# Patient Record
Sex: FEMALE | Race: WHITE | NOT HISPANIC OR LATINO | Employment: OTHER | URBAN - METROPOLITAN AREA
[De-identification: names, ages, dates, MRNs, and addresses within clinical notes are randomized per-mention and may not be internally consistent; named-entity substitution may affect disease eponyms.]

---

## 2017-02-01 ENCOUNTER — HOSPITAL ENCOUNTER (EMERGENCY)
Facility: CLINIC | Age: 57
Discharge: HOME | End: 2017-02-01
Attending: EMERGENCY MEDICINE | Admitting: EMERGENCY MEDICINE
Payer: COMMERCIAL

## 2017-02-01 ENCOUNTER — OFFICE VISIT (OUTPATIENT)
Dept: URGENT CARE | Facility: URGENT CARE | Age: 57
End: 2017-02-01
Payer: COMMERCIAL

## 2017-02-01 VITALS
BODY MASS INDEX: 29.35 KG/M2 | SYSTOLIC BLOOD PRESSURE: 122 MMHG | DIASTOLIC BLOOD PRESSURE: 78 MMHG | HEART RATE: 79 BPM | OXYGEN SATURATION: 96 % | TEMPERATURE: 97.6 F | WEIGHT: 184.6 LBS

## 2017-02-01 VITALS
BODY MASS INDEX: 28.88 KG/M2 | OXYGEN SATURATION: 96 % | WEIGHT: 184 LBS | SYSTOLIC BLOOD PRESSURE: 136 MMHG | DIASTOLIC BLOOD PRESSURE: 86 MMHG | TEMPERATURE: 98.1 F | RESPIRATION RATE: 18 BRPM | HEART RATE: 65 BPM | HEIGHT: 67 IN

## 2017-02-01 DIAGNOSIS — J06.9 VIRAL URI: ICD-10-CM

## 2017-02-01 DIAGNOSIS — R55 SYNCOPE, UNSPECIFIED SYNCOPE TYPE: Primary | ICD-10-CM

## 2017-02-01 DIAGNOSIS — R55 VASOVAGAL SYNCOPE: ICD-10-CM

## 2017-02-01 DIAGNOSIS — J34.89 NASAL SEPTAL DEFECT: ICD-10-CM

## 2017-02-01 LAB
ANION GAP SERPL CALCULATED.3IONS-SCNC: 6 MMOL/L (ref 3–14)
BASOPHILS # BLD AUTO: 0 10E9/L (ref 0–0.2)
BASOPHILS NFR BLD AUTO: 0.4 %
BUN SERPL-MCNC: 11 MG/DL (ref 7–30)
CALCIUM SERPL-MCNC: 9.3 MG/DL (ref 8.5–10.1)
CHLORIDE SERPL-SCNC: 105 MMOL/L (ref 94–109)
CO2 SERPL-SCNC: 29 MMOL/L (ref 20–32)
CREAT SERPL-MCNC: 0.81 MG/DL (ref 0.52–1.04)
DIFFERENTIAL METHOD BLD: ABNORMAL
EOSINOPHIL # BLD AUTO: 0.1 10E9/L (ref 0–0.7)
EOSINOPHIL NFR BLD AUTO: 2.1 %
ERYTHROCYTE [DISTWIDTH] IN BLOOD BY AUTOMATED COUNT: 12.3 % (ref 10–15)
GFR SERPL CREATININE-BSD FRML MDRD: 72 ML/MIN/1.7M2
GLUCOSE SERPL-MCNC: 103 MG/DL (ref 70–99)
HCT VFR BLD AUTO: 42.9 % (ref 35–47)
HGB BLD-MCNC: 14.3 G/DL (ref 11.7–15.7)
INTERPRETATION ECG - MUSE: NORMAL
LYMPHOCYTES # BLD AUTO: 2 10E9/L (ref 0.8–5.3)
LYMPHOCYTES NFR BLD AUTO: 34.8 %
MCH RBC QN AUTO: 33.3 PG (ref 26.5–33)
MCHC RBC AUTO-ENTMCNC: 33.3 G/DL (ref 31.5–36.5)
MCV RBC AUTO: 100 FL (ref 78–100)
MONOCYTES # BLD AUTO: 0.8 10E9/L (ref 0–1.3)
MONOCYTES NFR BLD AUTO: 14.6 %
NEUTROPHILS # BLD AUTO: 2.7 10E9/L (ref 1.6–8.3)
NEUTROPHILS NFR BLD AUTO: 48.1 %
PLATELET # BLD AUTO: 233 10E9/L (ref 150–450)
POTASSIUM SERPL-SCNC: 3.6 MMOL/L (ref 3.4–5.3)
RBC # BLD AUTO: 4.3 10E12/L (ref 3.8–5.2)
SODIUM SERPL-SCNC: 140 MMOL/L (ref 133–144)
TROPONIN I SERPL-MCNC: NORMAL UG/L (ref 0–0.04)
TSH SERPL DL<=0.005 MIU/L-ACNC: 0.95 MU/L (ref 0.4–4)
WBC # BLD AUTO: 5.6 10E9/L (ref 4–11)

## 2017-02-01 PROCEDURE — 36415 COLL VENOUS BLD VENIPUNCTURE: CPT | Performed by: PHYSICIAN ASSISTANT

## 2017-02-01 PROCEDURE — 80048 BASIC METABOLIC PNL TOTAL CA: CPT | Performed by: PHYSICIAN ASSISTANT

## 2017-02-01 PROCEDURE — 84443 ASSAY THYROID STIM HORMONE: CPT | Performed by: PHYSICIAN ASSISTANT

## 2017-02-01 PROCEDURE — 99283 EMERGENCY DEPT VISIT LOW MDM: CPT

## 2017-02-01 PROCEDURE — 84484 ASSAY OF TROPONIN QUANT: CPT | Performed by: PHYSICIAN ASSISTANT

## 2017-02-01 PROCEDURE — 93005 ELECTROCARDIOGRAM TRACING: CPT

## 2017-02-01 PROCEDURE — 85025 COMPLETE CBC W/AUTO DIFF WBC: CPT | Performed by: PHYSICIAN ASSISTANT

## 2017-02-01 PROCEDURE — 99215 OFFICE O/P EST HI 40 MIN: CPT | Performed by: PHYSICIAN ASSISTANT

## 2017-02-01 ASSESSMENT — ENCOUNTER SYMPTOMS
DIZZINESS: 0
SINUS PRESSURE: 1
VOMITING: 0
FEVER: 0
CONFUSION: 0
CHILLS: 0
WEAKNESS: 0
NUMBNESS: 0
DIARRHEA: 0
ABDOMINAL PAIN: 0
NAUSEA: 0
HEADACHES: 0
COUGH: 0
LIGHT-HEADEDNESS: 0
SHORTNESS OF BREATH: 0

## 2017-02-01 NOTE — NURSING NOTE
"Chief Complaint   Patient presents with     Sinus Problem     ear and facial pain x 2 days       Initial /78 mmHg  Pulse 79  Temp(Src) 97.6  F (36.4  C) (Oral)  Wt 184 lb 9.6 oz (83.734 kg)  SpO2 96% Estimated body mass index is 29.35 kg/(m^2) as calculated from the following:    Height as of 6/12/15: 5' 6.5\" (1.689 m).    Weight as of this encounter: 184 lb 9.6 oz (83.734 kg).  BP completed using cuff size: regular    "

## 2017-02-01 NOTE — ED PROVIDER NOTES
History     Chief Complaint:  Loss of Consciousness    HPI   Carmen Carvajal is a 57 year old female with a history of hypertension who presents for evaluation of loss of consciousness. The patient reports that she was at urgent care this morning for evaluation of cold symptoms for the last 3 days. The provider at the clinic told the patient that she had a perforated septum, which the patient had never been told before. The patient reports she was very nervous after hearing this. She suddenly started feeling very hot and had two brief syncopal episodes while sitting in the chair.  who witnessed this thinks these lasted about 10-15 seconds. He denies any shaking or seizure activity and the patient did not bite her tongue or lose control of her bowels of bladder. The patient woke up quickly and was back to normal soon after, but was brought to the ED by EMS for evaluation. On arrival to the ED, the patient reports that she feels fine now. She denies any shortness of breath or chest pain either in the ED or before she lost consciousness. She denies any significant headache, abdominal pain, nausea, or vomiting. She has had cold symptoms for the past 3 days and notes sinus pressure and nasal congestion. The patient has never passed out before. She notes that she took Coricidin this morning for her cold symptoms.     CARDIAC RISK FACTORS:  Sex:    Female  Tobacco:   No  Hypertension:   Yes  Hyperlipidemia:  No  Diabetes:   No  Family History:  No    Allergies:  No known drug allergies    Medications:    Levothyroxine  Amlodipine  Losartan-hydrochlorothiazide  Cholecalciferol  Vitamin B  Flonase    Past Medical History:    Hypertension  Retinal detachment      Past Surgical History:    Retinal reattachment surgery  Thyroid surgery    Family History:    Retinal detachment      Social History:  Smoking status: No  Alcohol use: No  No IV drug use   Presents to the ED with her   Marital Status:   [2]  "    Review of Systems   Constitutional: Negative for fever and chills.   HENT: Positive for congestion and sinus pressure.    Eyes: Negative for visual disturbance.   Respiratory: Negative for cough and shortness of breath.    Cardiovascular: Negative for chest pain and leg swelling.   Gastrointestinal: Negative for nausea, vomiting, abdominal pain and diarrhea.   Neurological: Positive for syncope. Negative for dizziness, weakness, light-headedness, numbness and headaches.   Psychiatric/Behavioral: Negative for confusion.   All other systems reviewed and are negative.      Physical Exam   First Vitals:  BP: 140/85 mmHg  Pulse: 65  Temp: 98.1  F (36.7  C)  Resp: 14  Height: 170.2 cm (5' 7\")  Weight: 83.462 kg (184 lb)  SpO2: 99 %      Physical Exam  General: Alert and cooperative with exam. Patient in mild distress. Normal mentation  Head:  Scalp is NC/AT  Eyes:  No scleral icterus, PERRL  ENT:  The external nose and ears are normal. The oropharynx is normal and without erythema; mucus membranes are moist.  Large nasal septal perforation, chronic.  Neck:  Normal range of motion without rigidity.   CV:  Regular rate and rhythm    No pathologic murmur   Resp:  Breath sounds are clear bilaterally    Non-labored, no retractions or accessory muscle use  GI:  Abdomen is soft, no distension, no tenderness.   MS:  No lower extremity edema   Skin:  Warm and dry, No rash or lesions noted.  Neuro: Oriented x 3. No gross motor deficits.    Strength and sensation grossly intact in all 4 extremities.      Cranial nerves 2-12 intact.    GCS: 15    Emergency Department Course   ECG (11:48:46):  Rate 65 bpm. CA interval 180. QRS duration 92. QT/QTc 390/405. P-R-T axes 38 15 33. Normal sinus rhythm. Normal ECG   No previous  Interpreted at 1150 by Christopher Campa DO.    Emergency Department Course:  Past medical records, nursing notes, and vitals reviewed.  1134: I performed an exam of the patient and obtained history, as " documented above.  IV inserted and blood drawn. The patient was placed on continuous cardiac monitoring and pulse oximetry.  ECG ordered, results above.     1216: I rechecked the patient.     I rechecked the patient.  Findings and plan explained to the Patient. Patient discharged home with instructions regarding supportive care, medications, and reasons to return. The importance of close follow-up was reviewed.     Impression & Plan      Medical Decision Making:  Carmen Carvajal is a 57 year old female who presents with a history and clinical exam consistent with syncope.  The patient's medical history and records were reviewed. Initial consideration for, but not limited to, cardiac arrhythmia, ACS, aortic stenosis, HOCM, PE, orthostatic hypotension, drugs, situational, carotid hypersensitivity, seizure, TIA, stroke, vasovagal.   There are no signs of a concerning etiology for syncope at this point.  In addition, there is no family history of sudden death, no chest pain, no seizure activity or post-ictal period, no murmur, and no signs of orthostasis in the ED, no focal neurologic symptoms and no complaints of concerning headache.  The workup in the ED is negative and the physical exam is re-assuring. At this point I believe the etiology of her syncope is likely vasovagal secondary to emotional distress from hearing about her perforated septum (perforation appears to be chronic based on H&P).  Supportive outpatient management is therefore indicated.  On discharge from the ED the patient was hemodynamically stable and neurologically intact.    Diagnosis:    ICD-10-CM   1. Vasovagal syncope R55     Disposition: Discharged to home    No Egan  2/1/2017    EMERGENCY DEPARTMENT    I, No Egan, am serving as a scribe at 11:34 AM on 2/1/2017 to document services personally performed by Christopher Campa DO based on my observations and the provider's statements to me.       Christopher Campa,    02/01/17 1942

## 2017-02-01 NOTE — PATIENT INSTRUCTIONS
(J06.9,  B97.89) Viral URI  (primary encounter diagnosis)  Comment:   Plan: steam treatments    (J34.89) Nasal septal defect  Comment: unknown until today  Plan: Advise evaluation by ENT within 7 days.  Patient given ENT clinic information

## 2017-02-01 NOTE — PROGRESS NOTES
SUBJECTIVE:   Carmen Carvajal is a 57 year old female presenting with a chief complaint of   1) runny nose and nasal congestion for the past 3 days  2) scratchy throat  3) bilateral ear discomfort. NO dizziness.  Onset of symptoms was as above.  Course of illness is worsening.    Severity moderate  Current and Associated symptoms: as above.  Denies any fevers.  Denies any headache.    Treatment measures tried include coricidin.  Predisposing factors include None.    Past Medical History   Diagnosis Date     Hypertension      Retinal detachment      right eye retina tear repair      There is no problem list on file for this patient.    H/O retinal detachment  HTN  Hypothyroidism    Social History   Substance Use Topics     Smoking status: Never Smoker      Smokeless tobacco: Never Used     Alcohol Use: Not on file       ROS:  CONSTITUTIONAL:NEGATIVE for fever, chills, change in weight  INTEGUMENTARY/SKIN: NEGATIVE for worrisome rashes, moles or lesions  EYES: NEGATIVE for vision changes or irritation  ENT/MOUTH: as per HPI  RESP:NEGATIVE for significant cough or SOB  CV: NEGATIVE for chest pain, palpitations or peripheral edema  MUSCULOSKELETAL: NEGATIVE for significant arthralgias or myalgia    OBJECTIVE  :/78 mmHg  Pulse 79  Temp(Src) 97.6  F (36.4  C) (Oral)  Wt 184 lb 9.6 oz (83.734 kg)  SpO2 96%  GENERAL APPEARANCE: healthy, alert and no distress  EYES: EOMI,  PERRL, conjunctiva clear  HENT: ear canals and TM's normal.  Nose and mouth without ulcers, erythema or lesions  HENT: septum with anterior defect approximately 1.5cm in diameter  NECK: supple, nontender, no lymphadenopathy  RESP: lungs clear to auscultation - no rales, rhonchi or wheezes  CV: regular rates and rhythm, normal S1 S2, no murmur noted  ABDOMEN:  soft, nontender, no HSM or masses and bowel sounds normal  NEURO: Normal strength and tone, sensory exam grossly normal,  normal speech and mentation  SKIN: no suspicious lesions or  wood    As patient was being discharged and we were leaving the room together, she started walking and walked into the wall instead of to the door that I had opened for her.  As she came up to the wall, I was holding her by the arm and I felt her body go limp and she lost consciouness.  I guided her to the floor with the help of her , who was in the room at the time.  After she was on the floor for a few minutes and talking normally, we helped her to stand and move 2 feet to the table to lie down.  She again went limp and lost consciousness as she arrived at the table, and her  and I guided her to the floor.    She denied any chest pain or shortness of breath.  She did not recall falling or losing consciousness.  911 was called after the second syncopal episode.  TSH, basic chemistry, CBC and troponin were drawn while patient was prone on exam table.      Patient's  alerted me to the fact that patient did NOT eat this morning, nor take her blood pressure medicine, but she did take the Coricidin medication for her cold symptoms.      (R55) Syncope, unspecified syncope type  (primary encounter diagnosis)  Comment: BP while lying after syncopal episodes was 90/63.    Plan: Basic metabolic panel  (Ca, Cl, CO2, Creat,         Gluc, K, Na, BUN), CBC with platelets and         differential, Troponin I, TSH with free T4         reflex          Patient is being transported to [facility] ED, unless in transport her  (who is on the phone with their insurance) decides that she has better coverage at their hospital system which is [facility].      Labs as of discharge:    Results for orders placed or performed in visit on 02/01/17   CBC with platelets and differential   Result Value Ref Range    WBC 5.6 4.0 - 11.0 10e9/L    RBC Count 4.30 3.8 - 5.2 10e12/L    Hemoglobin 14.3 11.7 - 15.7 g/dL    Hematocrit 42.9 35.0 - 47.0 %     78 - 100 fl    MCH 33.3 (H) 26.5 - 33.0 pg    MCHC 33.3 31.5 - 36.5 g/dL     RDW 12.3 10.0 - 15.0 %    Platelet Count 233 150 - 450 10e9/L    Diff Method Automated Method     % Neutrophils 48.1 %    % Lymphocytes 34.8 %    % Monocytes 14.6 %    % Eosinophils 2.1 %    % Basophils 0.4 %    Absolute Neutrophil 2.7 1.6 - 8.3 10e9/L    Absolute Lymphocytes 2.0 0.8 - 5.3 10e9/L    Absolute Monocytes 0.8 0.0 - 1.3 10e9/L    Absolute Eosinophils 0.1 0.0 - 0.7 10e9/L    Absolute Basophils 0.0 0.0 - 0.2 10e9/L       (J06.9,  B97.89) Viral URI  Comment:   Plan: steam treatments    (J34.89) Nasal septal defect  Comment: unknown by patient until today  Plan: Follow up with ENT.    Greater than 50% of the 50 minutes spent face to face with patient was discussing and reviewing the results of diagnostic tests, discussing risks and benefits of management (treatment) options, instruction for management and follow up and importance of compliance with treatment.

## 2017-02-01 NOTE — MR AVS SNAPSHOT
"              After Visit Summary   2/1/2017    Carmen Carvajal    MRN: [id number]           Patient Information     Date Of Birth          1960        Visit Information        Provider Department      2/1/2017 9:30 AM Carmen Holloway PA-C [facility]        Today's Diagnoses     Viral URI    -  1     Nasal septal defect           Care Instructions    (J06.9,  B97.89) Viral URI  (primary encounter diagnosis)  Comment:   Plan: steam treatments    (J34.89) Nasal septal defect  Comment: unknown until today  Plan: Advise evaluation by ENT within 7 days.  Patient given ENT clinic information            Follow-ups after your visit        Who to contact     If you have questions or need follow up information about today's clinic visit or your schedule please contact [facility] directly at [phone].  Normal or non-critical lab and imaging results will be communicated to you by MyChart, letter or phone within 4 business days after the clinic has received the results. If you do not hear from us within 7 days, please contact the clinic through [employer] or phone. If you have a critical or abnormal lab result, we will notify you by phone as soon as possible.  Submit refill requests through [facility] [facility] or call your pharmacy and they will forward the refill request to us. Please allow 3 business days for your refill to be completed.          Additional Information About Your Visit        MyChart Information     [employer] lets you send messages to your doctor, view your test results, renew your prescriptions, schedule appointments and more. To sign up, go to [email or web address] . Click on \"Log in\" on the left side of the screen, which will take you to the Welcome page. Then click on \"Sign up Now\" on the right side of the page.     You will be asked to enter the access code listed below, as well as some personal information. Please follow the directions to " create your username and password.     Your access code is: [email or web address]  Expires: 2017  9:58 AM     Your access code will  in 90 days. If you need help or a new code, please call your [facility] clinic or [phone].        Care EveryWhere ID     This is your Care EveryWhere ID. This could be used by other organizations to access your [facility] medical records  [id number]        Your Vitals Were     Pulse Temperature Pulse Oximetry             79 97.6  F (36.4  C) (Oral) 96%          Blood Pressure from Last 3 Encounters:   17 122/78   06/12/15 126/90    Weight from Last 3 Encounters:   17 184 lb 9.6 oz (83.734 kg)   06/12/15 205 lb (92.987 kg)              Today, you had the following     No orders found for display       Primary Care Provider Office Phone # Fax #    Carolyn Duong [phone]       [facility] [address] [place] [address]        Thank you!     Thank you for choosing [facility]  for your care. Our goal is always to provide you with excellent care. Hearing back from our patients is one way we can continue to improve our services. Please take a few minutes to complete the written survey that you may receive in the mail after your visit with us. Thank you!             Your Updated Medication List - Protect others around you: Learn how to safely use, store and throw away your medicines at www.disposemymeds.org.          This list is accurate as of: 17  9:58 AM.  Always use your most recent med list.                   Brand Name Dispense Instructions for use    AMLODIPINE BESYLATE PO      Take 10 mg by mouth daily       fluticasone 50 MCG/ACT spray    FLONASE     Spray 2 sprays into both nostrils daily       LEVOTHYROXINE SODIUM PO      Take 125 mcg by mouth daily       losartan-hydrochlorothiazide 100-12.5 MG per tablet    HYZAAR     Take 1 tablet by mouth daily       methylcellulose 500 MG Tabs tablet     CITRUCEL     Take 500 mg by mouth daily       multivitamin, therapeutic Tabs tablet      Take 1 tablet by mouth daily       OMEGA-3 FISH OIL PO      Take 1,200 mg by mouth       vitamin B complex with vitamin C Tabs tablet      Take 1 tablet by mouth daily       VITAMIN D3 PO      Take 2,000 Units by mouth daily

## 2017-02-01 NOTE — ED AVS SNAPSHOT
Emergency Department    [address] [facility] [address]    Phone:  [phone]    Fax:  [phone]                                       Carmen Carvajal   MRN: [id number]    Department:   Emergency Department   Date of Visit:  2/1/2017           Patient Information     Date Of Birth          1960        Your diagnoses for this visit were:     Vasovagal syncope        You were seen by Christopher Campa DO.      Follow-up Information     Follow up with Carolyn Duong    Specialty:  Internal Medicine    Why:  As needed    Contact information:    [facility]  [address] [place] [address] [phone]          Follow up with  Emergency Department.    Specialty:  EMERGENCY MEDICINE    Why:  If symptoms worsen    Contact information:    [address] [facility] [id number] [phone]      Discharge References/Attachments     SYNCOPE, VASOVAGAL (ENGLISH)      24 Hour Appointment Hotline       To make an appointment at any [facility], call [phone]). If you don't have a family doctor or clinic, we will help you find one. [facility] are conveniently located to serve the needs of you and your family.             Review of your medicines      Our records show that you are taking the medicines listed below. If these are incorrect, please call your family doctor or clinic.        Dose / Directions Last dose taken    AMLODIPINE BESYLATE PO   Dose:  10 mg        Take 10 mg by mouth daily   Refills:  0        fluticasone 50 MCG/ACT spray   Commonly known as:  FLONASE   Dose:  2 spray        Spray 2 sprays into both nostrils daily   Refills:  0        LEVOTHYROXINE SODIUM PO   Dose:  125 mcg        Take 125 mcg by mouth daily   Refills:  0        losartan-hydrochlorothiazide 100-12.5 MG per tablet   Commonly known as:  HYZAAR   Dose:  1 tablet        Take 1 tablet by mouth daily   Refills:  0         methylcellulose 500 MG Tabs tablet   Commonly known as:  CITRUCEL   Dose:  500 mg        Take 500 mg by mouth daily   Refills:  0        multivitamin, therapeutic Tabs tablet   Dose:  1 tablet        Take 1 tablet by mouth daily   Refills:  0        OMEGA-3 FISH OIL PO   Dose:  1200 mg        Take 1,200 mg by mouth   Refills:  0        vitamin B complex with vitamin C Tabs tablet   Dose:  1 tablet        Take 1 tablet by mouth daily   Refills:  0        VITAMIN D3 PO   Dose:  2000 Units        Take 2,000 Units by mouth daily   Refills:  0                Procedures and tests performed during your visit     EKG 12 lead      Orders Needing Specimen Collection     None      Pending Results     No orders found from 1/31/2017 to 2/2/2017.            Pending Culture Results     No orders found from 1/31/2017 to 2/2/2017.             Test Results from your hospital stay            Clinical Quality Measure: Blood Pressure Screening     Your blood pressure was checked while you were in the emergency department today. The last reading we obtained was  BP: 136/86 mmHg . Please read the guidelines below about what these numbers mean and what you should do about them.  If your systolic blood pressure (the top number) is less than 120 and your diastolic blood pressure (the bottom number) is less than 80, then your blood pressure is normal. There is nothing more that you need to do about it.  If your systolic blood pressure (the top number) is 120-139 or your diastolic blood pressure (the bottom number) is 80-89, your blood pressure may be higher than it should be. You should have your blood pressure rechecked within a year by a primary care provider.  If your systolic blood pressure (the top number) is 140 or greater or your diastolic blood pressure (the bottom number) is 90 or greater, you may have high blood pressure. High blood pressure is treatable, but if left untreated over time it can put you at risk for heart attack,  "stroke, or kidney failure. You should have your blood pressure rechecked by a primary care provider within the next 4 weeks.  If your provider in the emergency department today gave you specific instructions to follow-up with your doctor or provider even sooner than that, you should follow that instruction and not wait for up to 4 weeks for your follow-up visit.        Thank you for choosing [facility]       Thank you for choosing [facility] for your care. Our goal is always to provide you with excellent care. Hearing back from our patients is one way we can continue to improve our services. Please take a few minutes to complete the written survey that you may receive in the mail after you visit with us. Thank you!        [employer] Information     [employer] lets you send messages to your doctor, view your test results, renew your prescriptions, schedule appointments and more. To sign up, go to [email or web address] . Click on \"Log in\" on the left side of the screen, which will take you to the Welcome page. Then click on \"Sign up Now\" on the right side of the page.     You will be asked to enter the access code listed below, as well as some personal information. Please follow the directions to create your username and password.     Your access code is: [email or web address]  Expires: 2017  9:58 AM     Your access code will  in 90 days. If you need help or a new code, please call your [facility] clinic or [phone].        Care EveryWhere ID     This is your Care EveryWhere ID. This could be used by other organizations to access your [facility] medical records  [id number]        After Visit Summary       This is your record. Keep this with you and show to your community pharmacist(s) and doctor(s) at your next visit.                  "

## 2017-02-01 NOTE — ED NOTES
Pt.  At primary care clinic today for cold symptoms.  Took new cold medicine.  Was given new information about nasal septum issue.  Pt was stressed.  She felt hot.  Got up and passed out.  No new injuries and was helped to floor

## 2017-02-01 NOTE — ED AVS SNAPSHOT
Emergency Department    [address] [place] [address]    Phone:  [phone]    Fax:  [phone]                                       Carmen Carvajal   MRN: [id number]    Department:   Emergency Department   Date of Visit:  2/1/2017           After Visit Summary Signature Page     I have received my discharge instructions, and my questions have been answered. I have discussed any challenges I see with this plan with the nurse or doctor.    ..........................................................................................................................................  Patient/Patient Representative Signature      ..........................................................................................................................................  Patient Representative Print Name and Relationship to Patient    ..................................................               ................................................  Date                                            Time    ..........................................................................................................................................  Reviewed by Signature/Title    ...................................................              ..............................................  Date                                                            Time

## 2017-02-01 NOTE — ED NOTES
Bed: ED06  Expected date:   Expected time:   Means of arrival:   Comments:  micky 512. 72f syncope

## 2017-12-03 ENCOUNTER — RADIANT APPOINTMENT (OUTPATIENT)
Dept: GENERAL RADIOLOGY | Facility: CLINIC | Age: 57
End: 2017-12-03
Attending: PHYSICIAN ASSISTANT
Payer: COMMERCIAL

## 2017-12-03 ENCOUNTER — OFFICE VISIT (OUTPATIENT)
Dept: URGENT CARE | Facility: URGENT CARE | Age: 57
End: 2017-12-03
Payer: COMMERCIAL

## 2017-12-03 VITALS
OXYGEN SATURATION: 98 % | DIASTOLIC BLOOD PRESSURE: 91 MMHG | SYSTOLIC BLOOD PRESSURE: 141 MMHG | TEMPERATURE: 98.7 F | HEART RATE: 84 BPM | BODY MASS INDEX: 29.63 KG/M2 | WEIGHT: 189.2 LBS

## 2017-12-03 DIAGNOSIS — M25.562 ACUTE PAIN OF LEFT KNEE: ICD-10-CM

## 2017-12-03 DIAGNOSIS — M79.662 PAIN OF LEFT LOWER LEG: ICD-10-CM

## 2017-12-03 DIAGNOSIS — M25.562 ACUTE PAIN OF LEFT KNEE: Primary | ICD-10-CM

## 2017-12-03 LAB — D DIMER PPP FEU-MCNC: 0.3 UG/ML FEU (ref 0–0.5)

## 2017-12-03 PROCEDURE — 99214 OFFICE O/P EST MOD 30 MIN: CPT | Performed by: PHYSICIAN ASSISTANT

## 2017-12-03 PROCEDURE — 36415 COLL VENOUS BLD VENIPUNCTURE: CPT | Performed by: PHYSICIAN ASSISTANT

## 2017-12-03 PROCEDURE — 73562 X-RAY EXAM OF KNEE 3: CPT | Mod: LT

## 2017-12-03 PROCEDURE — 85379 FIBRIN DEGRADATION QUANT: CPT | Performed by: PHYSICIAN ASSISTANT

## 2017-12-03 RX ORDER — NAPROXEN 500 MG/1
500 TABLET ORAL 2 TIMES DAILY PRN
Qty: 30 TABLET | Refills: 1 | Status: SHIPPED | OUTPATIENT
Start: 2017-12-03 | End: 2020-08-23

## 2017-12-03 NOTE — NURSING NOTE
"Chief Complaint   Patient presents with     Knee Pain     Lt knee pain x on and off 3 days        Initial BP (!) 141/91  Pulse 84  Temp 98.7  F (37.1  C) (Oral)  Wt 189 lb 3.2 oz (85.8 kg)  SpO2 98%  BMI 29.63 kg/m2 Estimated body mass index is 29.63 kg/(m^2) as calculated from the following:    Height as of 2/1/17: 5' 7\" (1.702 m).    Weight as of this encounter: 189 lb 3.2 oz (85.8 kg).  Medication Reconciliation: complete    "

## 2017-12-03 NOTE — MR AVS SNAPSHOT
"              After Visit Summary   12/3/2017    Caremn Carvajal    MRN: [id number]           Patient Information     Date Of Birth          1960        Visit Information        Provider Department      12/3/2017 2:30 PM Marlon Cat PA-C [facility] [place] [facility]        Today's Diagnoses     Acute pain of left knee    -  1    Pain of left lower leg           Follow-ups after your visit        Who to contact     If you have questions or need follow up information about today's clinic visit or your schedule please contact [facility] directly at [phone].  Normal or non-critical lab and imaging results will be communicated to you by Gangkrhart, letter or phone within 4 business days after the clinic has received the results. If you do not hear from us within 7 days, please contact the clinic through Gangkrhart or phone. If you have a critical or abnormal lab result, we will notify you by phone as soon as possible.  Submit refill requests through RisparmioSuper or call your pharmacy and they will forward the refill request to us. Please allow 3 business days for your refill to be completed.          Additional Information About Your Visit        MyChart Information     RisparmioSuper lets you send messages to your doctor, view your test results, renew your prescriptions, schedule appointments and more. To sign up, go to [email or web address] . Click on \"Log in\" on the left side of the screen, which will take you to the Welcome page. Then click on \"Sign up Now\" on the right side of the page.     You will be asked to enter the access code listed below, as well as some personal information. Please follow the directions to create your username and password.     Your access code is: [email or web address]  Expires: 3/4/2018  4:16 PM     Your access code will  in 90 days. If you need help or a new code, please call your [place] clinic or [phone].        Care EveryWhere ID     This is " your Care EveryWhere ID. This could be used by other organizations to access your [place] medical records  [id number]        Your Vitals Were     Pulse Temperature Pulse Oximetry BMI (Body Mass Index)          84 98.7  F (37.1  C) (Oral) 98% 29.63 kg/m2         Blood Pressure from Last 3 Encounters:   12/03/17 (!) 141/91   02/01/17 136/86   02/01/17 122/78    Weight from Last 3 Encounters:   12/03/17 189 lb 3.2 oz (85.8 kg)   02/01/17 184 lb (83.5 kg)   02/01/17 184 lb 9.6 oz (83.7 kg)              We Performed the Following     D dimer quantitative          Today's Medication Changes          These changes are accurate as of: 12/3/17 11:59 PM.  If you have any questions, ask your nurse or doctor.               Start taking these medicines.        Dose/Directions    naproxen 500 MG tablet   Commonly known as:  NAPROSYN   Used for:  Acute pain of left knee, Pain of left lower leg   Started by:  Marlon Cat PA-C        Dose:  500 mg   Take 1 tablet (500 mg) by mouth 2 times daily as needed for moderate pain   Quantity:  30 tablet   Refills:  1            Where to get your medicines      These medications were sent to [facility] [id number] [place] [address] [address] AT [facility]  [address], [facility] [address]    Hours:  24-hours Phone:  [phone]     naproxen 500 MG tablet                Primary Care Provider Office Phone # Fax #    Carolyn PETAR Duong [phone]       [facility] [address] [place] [address]        Equal Access to Services     LILLY POWER AH: Hadii debra bañuelos Sorola, waaxda luqadaha, qaybta kaalmada adeegyada, lluvia canchola. So [facility] [phone].    ATENCIÓN: Si habla español, tiene a reynoso disposición servicios gratuitos de asistencia lingüística. Llame al [phone].    We comply with applicable federal civil rights laws and [place] laws. We do not discriminate on the basis  of race, color, national origin, age, disability, sex, sexual orientation, or gender identity.            Thank you!     Thank you for choosing [facility]  for your care. Our goal is always to provide you with excellent care. Hearing back from our patients is one way we can continue to improve our services. Please take a few minutes to complete the written survey that you may receive in the mail after your visit with us. Thank you!             Your Updated Medication List - Protect others around you: Learn how to safely use, store and throw away your medicines at www.disposemymeds.org.          This list is accurate as of: 12/3/17 11:59 PM.  Always use your most recent med list.                   Brand Name Dispense Instructions for use Diagnosis    AMLODIPINE BESYLATE PO      Take 10 mg by mouth daily        fluticasone 50 MCG/ACT spray    FLONASE     Spray 2 sprays into both nostrils daily        LEVOTHYROXINE SODIUM PO      Take 125 mcg by mouth daily        losartan-hydrochlorothiazide 100-12.5 MG per tablet    HYZAAR     Take 1 tablet by mouth daily        methylcellulose 500 MG Tabs tablet    CITRUCEL     Take 500 mg by mouth daily        multivitamin, therapeutic Tabs tablet      Take 1 tablet by mouth daily        naproxen 500 MG tablet    NAPROSYN    30 tablet    Take 1 tablet (500 mg) by mouth 2 times daily as needed for moderate pain    Acute pain of left knee, Pain of left lower leg       OMEGA-3 FISH OIL PO      Take 1,200 mg by mouth        vitamin B complex with vitamin C Tabs tablet      Take 1 tablet by mouth daily        VITAMIN D3 PO      Take 2,000 Units by mouth daily

## 2017-12-04 NOTE — PROGRESS NOTES
SUBJECTIVE:  Chief Complaint   Patient presents with     Knee Pain     Lt knee pain x on and off 3 days      Carmen Carvajal is a 57 year old female presents with a chief complaint of left knee pain and tenderness.   How: no known injury.  The patient complained of moderate pain  and has had decreased ROM.  Pain exacerbated by walking and weight-bearing.  Relieved by rest.  She treated it initially with no therapy. This is the first time this type of injury has occurred to this patient.     Past Medical History:   Diagnosis Date     Hypertension      Retinal detachment     right eye retina tear repair      ALLERGIES  No Known Allergies     Social History   Substance Use Topics     Smoking status: Never Smoker     Smokeless tobacco: Never Used     Alcohol use No       ROS:  CONSTITUTIONAL:NEGATIVE for fever, chills, change in weight  INTEGUMENTARY/SKIN: NEGATIVE for worrisome rashes, moles or lesions  MUSCULOSKELETAL: POSITIVE  for left knee tenderness  NEURO: NEGATIVE for weakness, dizziness or paresthesias    EXAM:   BP (!) 141/91  Pulse 84  Temp 98.7  F (37.1  C) (Oral)  Wt 189 lb 3.2 oz (85.8 kg)  SpO2 98%  BMI 29.63 kg/m2  Gen: healthy,alert,no distress  Extremity: knee has point tenderness anterior and medial knee tenderness.   There is not compromise to the distal circulation.  Pulses are +2 and CRT is brisk  EXTREMITIES: peripheral pulses normal  MS:  Positive for left anterior, medial and posterior knee tenderness  SKIN: no suspicious lesions or rashes  NEURO: Normal strength and tone, sensory exam grossly normal, mentation intact and speech normal    X-RAY was done and negative for acute changes including fracture Xray read by Marlon Cat at time of visit    Results for orders placed or performed in visit on 12/03/17   D dimer quantitative   Result Value Ref Range    D Dimer 0.3 0.0 - 0.50 ug/ml FEU       ASSESSMENT/PLAN      ICD-10-CM    1. Acute pain of left knee M25.562 XR Knee Left 3 Views      naproxen (NAPROSYN) 500 MG tablet   2. Pain of left lower leg M79.662 D dimer quantitative     naproxen (NAPROSYN) 500 MG tablet     RICE treatment: Rest, Ice, compression, elevation   D-dimer to rule out DVT  Wear knee brace for comfort  Follow up with ortho as needed

## 2018-01-08 ENCOUNTER — OFFICE VISIT (OUTPATIENT)
Dept: URGENT CARE | Facility: URGENT CARE | Age: 58
End: 2018-01-08
Payer: COMMERCIAL

## 2018-01-08 ENCOUNTER — HOSPITAL ENCOUNTER (OUTPATIENT)
Dept: ULTRASOUND IMAGING | Facility: CLINIC | Age: 58
Discharge: HOME | End: 2018-01-08
Attending: PHYSICIAN ASSISTANT | Admitting: PHYSICIAN ASSISTANT
Payer: COMMERCIAL

## 2018-01-08 VITALS
SYSTOLIC BLOOD PRESSURE: 140 MMHG | WEIGHT: 195 LBS | RESPIRATION RATE: 20 BRPM | DIASTOLIC BLOOD PRESSURE: 76 MMHG | BODY MASS INDEX: 30.54 KG/M2 | TEMPERATURE: 97.5 F

## 2018-01-08 DIAGNOSIS — M79.662 PAIN OF LEFT CALF: ICD-10-CM

## 2018-01-08 DIAGNOSIS — M79.89 LEFT LEG SWELLING: Primary | ICD-10-CM

## 2018-01-08 DIAGNOSIS — M79.89 LEFT LEG SWELLING: ICD-10-CM

## 2018-01-08 LAB — D DIMER PPP FEU-MCNC: 0.9 UG/ML FEU (ref 0–0.5)

## 2018-01-08 PROCEDURE — 99215 OFFICE O/P EST HI 40 MIN: CPT | Performed by: PHYSICIAN ASSISTANT

## 2018-01-08 PROCEDURE — 93971 EXTREMITY STUDY: CPT | Mod: LT

## 2018-01-08 PROCEDURE — 36415 COLL VENOUS BLD VENIPUNCTURE: CPT | Performed by: PHYSICIAN ASSISTANT

## 2018-01-08 PROCEDURE — 85379 FIBRIN DEGRADATION QUANT: CPT | Performed by: PHYSICIAN ASSISTANT

## 2018-01-08 RX ORDER — NAPROXEN 500 MG/1
500 TABLET ORAL 2 TIMES DAILY PRN
Qty: 30 TABLET | Refills: 1 | Status: SHIPPED | OUTPATIENT
Start: 2018-01-08 | End: 2020-08-23

## 2018-01-08 NOTE — NURSING NOTE
Pt scheduled at [facility] at 2:30 pm for an U/S of the left leg with a read and call. Pt instructions given.

## 2018-01-08 NOTE — MR AVS SNAPSHOT
"              After Visit Summary   1/8/2018    Carmen Carvajal    MRN: [id number]           Patient Information     Date Of Birth          1960        Visit Information        Provider Department      1/8/2018 9:20 AM Marlon Cat PA-C [facility]        Today's Diagnoses     Left leg swelling    -  1    Pain of left calf           Follow-ups after your visit        Future tests that were ordered for you today     Open Future Orders        Priority Expected Expires Ordered    US Lower Extremity Venous Duplex Left STAT  1/8/2019 1/8/2018            Who to contact     If you have questions or need follow up information about today's clinic visit or your schedule please contact [facility] directly at [phone].  Normal or non-critical lab and imaging results will be communicated to you by MyChart, letter or phone within 4 business days after the clinic has received the results. If you do not hear from us within 7 days, please contact the clinic through MyChart or phone. If you have a critical or abnormal lab result, we will notify you by phone as soon as possible.  Submit refill requests through Responsa or call your pharmacy and they will forward the refill request to us. Please allow 3 business days for your refill to be completed.          Additional Information About Your Visit        MyChart Information     Responsa lets you send messages to your doctor, view your test results, renew your prescriptions, schedule appointments and more. To sign up, go to [email or web address] . Click on \"Log in\" on the left side of the screen, which will take you to the Welcome page. Then click on \"Sign up Now\" on the right side of the page.     You will be asked to enter the access code listed below, as well as some personal information. Please follow the directions to create your username and password.     Your access code is: [email or web address]  Expires: 3/4/2018  " 4:16 PM     Your access code will  in 90 days. If you need help or a new code, please call your [place] clinic or [phone].        Care EveryWhere ID     This is your Care EveryWhere ID. This could be used by other organizations to access your [place] medical records  [id number]        Your Vitals Were     Temperature Respirations BMI (Body Mass Index)             97.5  F (36.4  C) (Oral) 20 30.54 kg/m2          Blood Pressure from Last 3 Encounters:   18 140/76   17 (!) 141/91   17 136/86    Weight from Last 3 Encounters:   18 195 lb (88.5 kg)   17 189 lb 3.2 oz (85.8 kg)   17 184 lb (83.5 kg)              We Performed the Following     D dimer quantitative          Today's Medication Changes          These changes are accurate as of: 18  3:48 PM.  If you have any questions, ask your nurse or doctor.               These medicines have changed or have updated prescriptions.        Dose/Directions    * naproxen 500 MG tablet   Commonly known as:  NAPROSYN   This may have changed:  Another medication with the same name was added. Make sure you understand how and when to take each.   Used for:  Acute pain of left knee, Pain of left lower leg   Changed by:  Marlon Cat PA-C        Dose:  500 mg   Take 1 tablet (500 mg) by mouth 2 times daily as needed for moderate pain   Quantity:  30 tablet   Refills:  1       * naproxen 500 MG tablet   Commonly known as:  NAPROSYN   This may have changed:  You were already taking a medication with the same name, and this prescription was added. Make sure you understand how and when to take each.   Used for:  Left leg swelling, Pain of left calf   Changed by:  Marlon Cat PA-C        Dose:  500 mg   Take 1 tablet (500 mg) by mouth 2 times daily as needed for moderate pain   Quantity:  30 tablet   Refills:  1       * Notice:  This list has 2 medication(s) that are the same as other medications prescribed for you. Read the  directions carefully, and ask your doctor or other care provider to review them with you.         Where to get your medicines      These medications were sent to [id number] - [place], [place] - [address] AT [facility] [address], [facility] [address]    Hours:  24-hours Phone:  [phone]     naproxen 500 MG tablet                Primary Care Provider Office Phone # Fax #    Carolyn Duong [phone]       [facility] [address] [place] [address]        Equal Access to Services     CASSIA POWER : Hadii aad ku hadasho Soomaali, waaxda luqadaha, qaybta kaalmada adeegyada, waxay idiin hayaan adeeg osvaldo canchola. So [facility] [phone].    ATENCIÓN: Si habla español, tiene a reynoso disposición servicios gratuitos de asistencia lingüística. [facility] [phone].    We comply with applicable federal civil rights laws and [place] laws. We do not discriminate on the basis of race, color, national origin, age, disability, sex, sexual orientation, or gender identity.            Thank you!     Thank you for choosing [facility] [place]  for your care. Our goal is always to provide you with excellent care. Hearing back from our patients is one way we can continue to improve our services. Please take a few minutes to complete the written survey that you may receive in the mail after your visit with us. Thank you!             Your Updated Medication List - Protect others around you: Learn how to safely use, store and throw away your medicines at www.disposemymeds.org.          This list is accurate as of: 1/8/18  3:48 PM.  Always use your most recent med list.                   Brand Name Dispense Instructions for use Diagnosis    AMLODIPINE BESYLATE PO      Take 10 mg by mouth daily        fluticasone 50 MCG/ACT spray    FLONASE     Spray 2 sprays into both nostrils daily        LEVOTHYROXINE SODIUM PO      Take 125  mcg by mouth daily        losartan-hydrochlorothiazide 100-12.5 MG per tablet    HYZAAR     Take 1 tablet by mouth daily        methylcellulose 500 MG Tabs tablet    CITRUCEL     Take 500 mg by mouth daily        multivitamin, therapeutic Tabs tablet      Take 1 tablet by mouth daily        * naproxen 500 MG tablet    NAPROSYN    30 tablet    Take 1 tablet (500 mg) by mouth 2 times daily as needed for moderate pain    Acute pain of left knee, Pain of left lower leg       * naproxen 500 MG tablet    NAPROSYN    30 tablet    Take 1 tablet (500 mg) by mouth 2 times daily as needed for moderate pain    Left leg swelling, Pain of left calf       OMEGA-3 FISH OIL PO      Take 1,200 mg by mouth        vitamin B complex with vitamin C Tabs tablet      Take 1 tablet by mouth daily        VITAMIN D3 PO      Take 2,000 Units by mouth daily        * Notice:  This list has 2 medication(s) that are the same as other medications prescribed for you. Read the directions carefully, and ask your doctor or other care provider to review them with you.

## 2018-01-08 NOTE — NURSING NOTE
"Chief Complaint   Patient presents with     Swelling     lt ankle swelling for past few days       Initial /76 (Cuff Size: Adult Regular)  Temp 97.5  F (36.4  C) (Oral)  Resp 20  Wt 195 lb (88.5 kg)  BMI 30.54 kg/m2 Estimated body mass index is 30.54 kg/(m^2) as calculated from the following:    Height as of 2/1/17: 5' 7\" (1.702 m).    Weight as of this encounter: 195 lb (88.5 kg).  Medication Reconciliation: complete Xiomara SORIANO    "

## 2018-01-08 NOTE — PROGRESS NOTES
SUBJECTIVE:  Chief Complaint   Patient presents with     Swelling     lt ankle swelling for past few days     Carmen Carvajal is a 57 year old female presents with a chief complaint of left calf tightness, pressure, tenderness, localized swelling .  How: started 3 days ago.  The patient complained of mild pain  and has had decreased ROM.  Pain exacerbated by walking and weight-bearing.  Relieved by rest.  She treated it initially with no therapy. This is the first time this type of injury has occurred to this patient.     Past Medical History:   Diagnosis Date     Hypertension      Retinal detachment     right eye retina tear repair      Allergies   Allergen Reactions     Ace Inhibitors Cough     Fam Hx  Bakers Cyst  HTN    Social History   Substance Use Topics     Smoking status: Never Smoker     Smokeless tobacco: Never Used     Alcohol use No       ROS:  CONSTITUTIONAL:NEGATIVE for fever, chills, change in weight  INTEGUMENTARY/SKIN: POSITIVE for mild lower left ankle swelling  RESP:NEGATIVE for significant cough or SOB  CV: NEGATIVE for chest pain, palpitations or peripheral edema  GI: NEGATIVE for nausea, abdominal pain, heartburn, or change in bowel habits  LYMPH: NEGATIVE for lymph node swelling  EXT POSITIVE for calf left ankle swelling  MUSCULOSKELETAL: Positive for left calf tightness, tenderness  VASC: NEGATIVE for cold extremities   NEURO: NEGATIVE for weakness, dizziness or paresthesias    EXAM:   /76 (Cuff Size: Adult Regular)  Temp 97.5  F (36.4  C) (Oral)  Resp 20  Wt 195 lb (88.5 kg)  BMI 30.54 kg/m2  GEN: healthy,alert,no distress  EXT: lower leg has tightness, tenderness.   VASC: There is not compromise to the distal circulation.  Pulses are +2 and CRT is brisk  CHEST: clear to auscultation  CV: regular rate and rhythm  GI Negative for abdominal pain, tenderness  EXTREMITIES: peripheral pulses normal  MS:  Positive for left calf tightness  SKIN: positive for left calf swelling,  localized   NEURO: Normal strength and tone, sensory exam grossly normal, mentation intact and speech normal    Leg ultrasound Study Result   ULTRASOUND VENOUS LOWER EXTREMITY UNILATERAL LEFT January 8, 2018 2:39  PM      HISTORY: Pain of left calf; left leg swelling.     COMPARISON: None.     TECHNIQUE: Ultrasound gray scale, Color Doppler flow, and spectral  Doppler waveform analysis performed.     FINDINGS: In the left proximal medial calf, there is a hypoechoic  collection that measures 5.3 x 1.2 x 3.9 cm and has reticular bands of  internal echogenicity. The left common femoral, superficial femoral,  popliteal and posterior tibial veins are patent and fully compressible  and demonstrate normal venous Doppler flow. The visualized greater  saphenous vein is negative for thrombus.          IMPRESSION: No deep vein thrombosis demonstrated. There appears to be  a small hematoma in the proximal medial left calf.     Results discussed with Marlon Cat at 2:45 PM on 1/8/2018.            ASSESSMENT/PLAN:    ICD-10-CM    1. Left leg swelling M79.89 D dimer quantitative     US Lower Extremity Venous Duplex Left     naproxen (NAPROSYN) 500 MG tablet   2. Pain of left calf M79.662 US Lower Extremity Venous Duplex Left     naproxen (NAPROSYN) 500 MG tablet       Leg ultrasound used to rule out DVT  Alternate warm and cold compresses  ROM exercises  Use naprosyn prn  Advised to follow up with Orthopedics next week for recheck

## 2018-06-30 ENCOUNTER — OFFICE VISIT (OUTPATIENT)
Dept: URGENT CARE | Facility: URGENT CARE | Age: 58
End: 2018-06-30
Payer: COMMERCIAL

## 2018-06-30 VITALS
RESPIRATION RATE: 12 BRPM | DIASTOLIC BLOOD PRESSURE: 82 MMHG | SYSTOLIC BLOOD PRESSURE: 112 MMHG | WEIGHT: 178.4 LBS | TEMPERATURE: 98.2 F | BODY MASS INDEX: 27.94 KG/M2 | HEART RATE: 68 BPM | OXYGEN SATURATION: 95 %

## 2018-06-30 DIAGNOSIS — S61.209A OPEN WOUND OF FINGER, INITIAL ENCOUNTER: Primary | ICD-10-CM

## 2018-06-30 PROCEDURE — 12001 RPR S/N/AX/GEN/TRNK 2.5CM/<: CPT | Performed by: FAMILY MEDICINE

## 2018-06-30 NOTE — MR AVS SNAPSHOT
After Visit Summary   6/30/2018    Carmen Carvajal    MRN: [id number]           Patient Information     Date Of Birth          1960        Visit Information        Provider Department      6/30/2018 11:45 AM Hudson Nelson MD [facility]        Today's Diagnoses     Open wound of finger, initial encounter    -  1       Follow-ups after your visit        Who to contact     If you have questions or need follow up information about today's clinic visit or your schedule please contact [facility] directly at [phone].  Normal or non-critical lab and imaging results will be communicated to you by MyChart, letter or phone within 4 business days after the clinic has received the results. If you do not hear from us within 7 days, please contact the clinic through MyChart or phone. If you have a critical or abnormal lab result, we will notify you by phone as soon as possible.  Submit refill requests through Allocade or call your pharmacy and they will forward the refill request to us. Please allow 3 business days for your refill to be completed.          Additional Information About Your Visit        Care EveryWhere ID     This is your Care EveryWhere ID. This could be used by other organizations to access your [place] [facility] medical records  [id number]        Your Vitals Were     Pulse Temperature Respirations Pulse Oximetry BMI (Body Mass Index)       68 98.2  F (36.8  C) (Oral) 12 95% 27.94 kg/m2        Blood Pressure from Last 3 Encounters:   06/30/18 112/82   01/08/18 140/76   12/03/17 (!) 141/91    Weight from Last 3 Encounters:   06/30/18 178 lb 6.4 oz (80.9 kg)   01/08/18 195 lb (88.5 kg)   12/03/17 189 lb 3.2 oz (85.8 kg)              We Performed the Following     Less than 2.5 cm in length        Primary Care Provider Office Phone # Fax #    Carolyn Duong [phone]       [facility] [address]  [facility] [address]        Equal Access to Services     CASSIA POWER : Hadii debra oliver sarmad Perdomoali, washellida luelenapatriciaha, qaybta tavarespaulajodie medina, lluvia fernandezdelucio canchola. So [facility] [phone].    ATENCIÓN: Si habla español, tiene a reynoso disposición servicios gratuitos de asistencia lingüística. Llame al [phone].    We comply with applicable federal civil rights laws and [place] laws. We do not discriminate on the basis of race, color, national origin, age, disability, sex, sexual orientation, or gender identity.            Thank you!     Thank you for choosing [facility]  for your care. Our goal is always to provide you with excellent care. Hearing back from our patients is one way we can continue to improve our services. Please take a few minutes to complete the written survey that you may receive in the mail after your visit with us. Thank you!             Your Updated Medication List - Protect others around you: Learn how to safely use, store and throw away your medicines at www.disposemymeds.org.          This list is accurate as of 6/30/18 11:59 PM.  Always use your most recent med list.                   Brand Name Dispense Instructions for use Diagnosis    AMLODIPINE BESYLATE PO      Take 10 mg by mouth daily        fluticasone 50 MCG/ACT spray    FLONASE     Spray 2 sprays into both nostrils daily        LEVOTHYROXINE SODIUM PO      Take 125 mcg by mouth daily        losartan-hydrochlorothiazide 100-12.5 MG per tablet    HYZAAR     Take 1 tablet by mouth daily        methylcellulose 500 MG Tabs tablet    CITRUCEL     Take 500 mg by mouth daily        multivitamin, therapeutic Tabs tablet      Take 1 tablet by mouth daily        * naproxen 500 MG tablet    NAPROSYN    30 tablet    Take 1 tablet (500 mg) by mouth 2 times daily as needed for moderate pain    Acute pain of left knee, Pain of left lower leg       * naproxen 500 MG tablet    NAPROSYN     30 tablet    Take 1 tablet (500 mg) by mouth 2 times daily as needed for moderate pain    Left leg swelling, Pain of left calf       OMEGA-3 FISH OIL PO      Take 1,200 mg by mouth        vitamin B complex with vitamin C Tabs tablet      Take 1 tablet by mouth daily        VITAMIN D3 PO      Take 2,000 Units by mouth daily        * Notice:  This list has 2 medication(s) that are the same as other medications prescribed for you. Read the directions carefully, and ask your doctor or other care provider to review them with you.

## 2018-07-09 ENCOUNTER — OFFICE VISIT (OUTPATIENT)
Dept: URGENT CARE | Facility: URGENT CARE | Age: 58
End: 2018-07-09
Payer: COMMERCIAL

## 2018-07-09 VITALS — TEMPERATURE: 97.6 F

## 2018-07-09 DIAGNOSIS — Z48.02 VISIT FOR SUTURE REMOVAL: Primary | ICD-10-CM

## 2018-07-09 PROCEDURE — 99207 ZZC NO CHARGE NURSE ONLY: CPT

## 2018-07-09 NOTE — MR AVS SNAPSHOT
After Visit Summary   7/9/2018    Carmen Carvajal    MRN: [id number]           Patient Information     Date Of Birth          1960        Visit Information        Provider Department      7/9/2018 9:15 AM Provider, Kadeem Flores MD [facility]        Today's Diagnoses     Visit for suture removal    -  1       Follow-ups after your visit        Who to contact     If you have questions or need follow up information about today's clinic visit or your schedule please contact [facility] directly at [phone].  Normal or non-critical lab and imaging results will be communicated to you by MyChart, letter or phone within 4 business days after the clinic has received the results. If you do not hear from us within 7 days, please contact the clinic through MyChart or phone. If you have a critical or abnormal lab result, we will notify you by phone as soon as possible.  Submit refill requests through [employer] or call your pharmacy and they will forward the refill request to us. Please allow 3 business days for your refill to be completed.          Additional Information About Your Visit        Care EveryWhere ID     This is your Care EveryWhere ID. This could be used by other organizations to access your [facility] medical records  [id number]        Your Vitals Were     Temperature                   97.6  F (36.4  C) (Oral)            Blood Pressure from Last 3 Encounters:   06/30/18 112/82   01/08/18 140/76   12/03/17 (!) 141/91    Weight from Last 3 Encounters:   06/30/18 178 lb 6.4 oz (80.9 kg)   01/08/18 195 lb (88.5 kg)   12/03/17 189 lb 3.2 oz (85.8 kg)              Today, you had the following     No orders found for display       Primary Care Provider Office Phone # Fax #    Carolyn DAVEY Labayen [phone]       [facility] [address] [place] [address]        Equal Access to Services     CASSIA POWER  AH: Hadii debra oliver sarmad Sotahminaali, waaxda luqadaha, qaybta kaaljean-paul medina, lluvia chrisin hayaatushar agustinponcho riley ashvin canchola. So [facility] [phone].    ATENCIÓN: Si habla joselyn, tiene a reynoso disposición servicios gratuitos de asistencia lingüística. Llame al [phone].    We comply with applicable federal civil rights laws and [place] laws. We do not discriminate on the basis of race, color, national origin, age, disability, sex, sexual orientation, or gender identity.            Thank you!     Thank you for choosing [facility]  for your care. Our goal is always to provide you with excellent care. Hearing back from our patients is one way we can continue to improve our services. Please take a few minutes to complete the written survey that you may receive in the mail after your visit with us. Thank you!             Your Updated Medication List - Protect others around you: Learn how to safely use, store and throw away your medicines at www.disposemymeds.org.          This list is accurate as of 7/9/18  2:02 PM.  Always use your most recent med list.                   Brand Name Dispense Instructions for use Diagnosis    AMLODIPINE BESYLATE PO      Take 10 mg by mouth daily        fluticasone 50 MCG/ACT spray    FLONASE     Spray 2 sprays into both nostrils daily        LEVOTHYROXINE SODIUM PO      Take 125 mcg by mouth daily        losartan-hydrochlorothiazide 100-12.5 MG per tablet    HYZAAR     Take 1 tablet by mouth daily        methylcellulose 500 MG Tabs tablet    CITRUCEL     Take 500 mg by mouth daily        multivitamin, therapeutic Tabs tablet      Take 1 tablet by mouth daily        * naproxen 500 MG tablet    NAPROSYN    30 tablet    Take 1 tablet (500 mg) by mouth 2 times daily as needed for moderate pain    Acute pain of left knee, Pain of left lower leg       * naproxen 500 MG tablet    NAPROSYN    30 tablet    Take 1 tablet (500 mg) by mouth 2 times daily as needed for  moderate pain    Left leg swelling, Pain of left calf       OMEGA-3 FISH OIL PO      Take 1,200 mg by mouth        vitamin B complex with vitamin C Tabs tablet      Take 1 tablet by mouth daily        VITAMIN D3 PO      Take 2,000 Units by mouth daily        * Notice:  This list has 2 medication(s) that are the same as other medications prescribed for you. Read the directions carefully, and ask your doctor or other care provider to review them with you.

## 2018-07-16 NOTE — PROGRESS NOTES
CHIEF COMPLAINT:   Chief Complaint   Patient presents with     Laceration     Left hand third digit.  Pt cut her digit on a broken class. x 3 hours     SUBJECTIVE:   58 year old female sustained laceration of finger 3 hours ago. Nature of injury: broken glass. Tetanus vaccination status reviewed: tetanus re-vaccination not indicated.     OBJECTIVE:   Patient appears well, vitals are normal. Laceration 1.5 cm noted volar middle finger L.  Description: clean wound edges, no foreign bodies. Neurovascular and tendon structures are intact.    ASSESSMENT:   Laceration as described.    PLAN:   Anesthesia with Lidocaine 1% plain. Wound cleansed, debrided of visible foreign material and necrotic tissue, and sutured. Dressing applied.  Wound care instructions provided.  Observe for any signs of infection or other problems.  Return for suture removal in 10 days.

## 2018-09-21 DIAGNOSIS — M79.89 LEFT LEG SWELLING: ICD-10-CM

## 2018-09-21 DIAGNOSIS — M79.662 PAIN OF LEFT CALF: ICD-10-CM

## 2018-09-24 RX ORDER — NAPROXEN 500 MG/1
TABLET ORAL
Qty: 180 TABLET | Refills: 1 | OUTPATIENT
Start: 2018-09-24

## 2019-08-16 NOTE — NURSING NOTE
Carmen Carvajal presents to the clinic for removal of sutures and sutures,staples, steri strips. The patient has had sutures in place for 10 days. There has been no patient reported signs or symptoms of infection or drainage. 2  sutures and sutures,staples, staple, steri strips are seen and located on the left hand index finger . Tetanus status is up to date. All sutures and sutures,staples, steri strips were easily removed today. Routine wound care discussed by the RN or provider. The patient will follow up as needed. Lnida Ortiz CMA      
+dizziness

## 2020-08-23 ENCOUNTER — OFFICE VISIT (OUTPATIENT)
Dept: URGENT CARE | Facility: URGENT CARE | Age: 60
End: 2020-08-23
Payer: COMMERCIAL

## 2020-08-23 VITALS
WEIGHT: 178 LBS | OXYGEN SATURATION: 96 % | DIASTOLIC BLOOD PRESSURE: 77 MMHG | HEART RATE: 69 BPM | RESPIRATION RATE: 16 BRPM | TEMPERATURE: 98.1 F | SYSTOLIC BLOOD PRESSURE: 118 MMHG | BODY MASS INDEX: 28.61 KG/M2 | HEIGHT: 66 IN

## 2020-08-23 DIAGNOSIS — R30.0 DYSURIA: ICD-10-CM

## 2020-08-23 DIAGNOSIS — R82.90 NONSPECIFIC FINDING ON EXAMINATION OF URINE: ICD-10-CM

## 2020-08-23 DIAGNOSIS — N39.0 ACUTE UTI: Primary | ICD-10-CM

## 2020-08-23 LAB
ALBUMIN UR-MCNC: NEGATIVE MG/DL
APPEARANCE UR: CLEAR
BACTERIA #/AREA URNS HPF: ABNORMAL /HPF
BILIRUB UR QL STRIP: NEGATIVE
COLOR UR AUTO: YELLOW
GLUCOSE UR STRIP-MCNC: NEGATIVE MG/DL
HGB UR QL STRIP: NEGATIVE
KETONES UR STRIP-MCNC: NEGATIVE MG/DL
LEUKOCYTE ESTERASE UR QL STRIP: ABNORMAL
NITRATE UR QL: NEGATIVE
PH UR STRIP: 6 PH (ref 5–7)
RBC #/AREA URNS AUTO: ABNORMAL /HPF
SOURCE: ABNORMAL
SP GR UR STRIP: 1.02 (ref 1–1.03)
UROBILINOGEN UR STRIP-ACNC: 0.2 EU/DL (ref 0.2–1)
WBC #/AREA URNS AUTO: ABNORMAL /HPF

## 2020-08-23 PROCEDURE — 99213 OFFICE O/P EST LOW 20 MIN: CPT | Performed by: PHYSICIAN ASSISTANT

## 2020-08-23 PROCEDURE — 87088 URINE BACTERIA CULTURE: CPT | Performed by: PHYSICIAN ASSISTANT

## 2020-08-23 PROCEDURE — 81001 URINALYSIS AUTO W/SCOPE: CPT | Performed by: PHYSICIAN ASSISTANT

## 2020-08-23 PROCEDURE — 87186 SC STD MICRODIL/AGAR DIL: CPT | Performed by: PHYSICIAN ASSISTANT

## 2020-08-23 PROCEDURE — 87086 URINE CULTURE/COLONY COUNT: CPT | Performed by: PHYSICIAN ASSISTANT

## 2020-08-23 RX ORDER — CEFDINIR 300 MG/1
300 CAPSULE ORAL 2 TIMES DAILY
Qty: 20 CAPSULE | Refills: 0 | Status: SHIPPED | OUTPATIENT
Start: 2020-08-23 | End: 2020-09-02

## 2020-08-23 RX ORDER — RISPERIDONE 2 MG/1
TABLET ORAL
COMMUNITY
Start: 2020-08-21 | End: 2024-06-11

## 2020-08-23 ASSESSMENT — MIFFLIN-ST. JEOR: SCORE: 1376.21

## 2020-08-23 NOTE — PATIENT INSTRUCTIONS
Patient Education     Urinary Tract Infections in Women    Urinary tract infections (UTIs) are most often caused by bacteria. These bacteria enter the urinary tract. The bacteria may come from outside the body. Or they may travel from the skin outside the rectum or vagina into the urethra. Female anatomy makes it easy for bacteria from the bowel to enter a woman s urinary tract, which is the most common source of UTI. This means women develop UTIs more often than men. Pain in or around the urinary tract is a common UTI symptom. But the only way to know for sure if you have a UTI for the healthcare provider to test your urine. The two tests that may be done are the urinalysis and urine culture.  Types of UTIs    Cystitis. A bladder infection (cystitis) is the most common UTI in women. You may have urgent or frequent urination. You may also have pain, burning when you urinate, and bloody urine.    Urethritis. This is an inflamed urethra, which is the tube that carries urine from the bladder to outside the body. You may have lower stomach or back pain. You may also have urgent or frequent urination.    Pyelonephritis. This is a kidney infection. If not treated, it can be serious and damage your kidneys. In severe cases, you may need to stay in the hospital. You may have a fever and lower back pain.  Medicines to treat a UTI  Most UTIs are treated with antibiotics. These kill the bacteria. The length of time you need to take them depends on the type of infection. It may be as short as 3 days. If you have repeated UTIs, you may need a low-dose antibiotic for several months. Take antibiotics exactly as directed. Don t stop taking them until all of the medicine is gone. If you stop taking the antibiotic too soon, the infection may not go away. You may also develop a resistance to the antibiotic. This can make it much harder to treat.  Lifestyle changes to treat and prevent UTIs  The lifestyle changes below will help get  rid of your UTI. They may also help prevent future UTIs.    Drink plenty of fluids. This includes water, juice, or other caffeine-free drinks. Fluids help flush bacteria out of your body.    Empty your bladder. Always empty your bladder when you feel the urge to urinate. And always urinate before going to sleep. Urine that stays in your bladder can lead to infection. Try to urinate before and after sex as well.    Practice good personal hygiene. Wipe yourself from front to back after using the toilet. This helps keep bacteria from getting into the urethra.    Use condoms during sex. These help prevent UTIs caused by sexually transmitted bacteria. Also don't use spermicides during sex. These can increase the risk for UTIs. Choose other forms of birth control instead. For women who tend to get UTIs after sex, a low-dose of a preventive antibiotic may be used. Be sure to discuss this option with your healthcare provider.    Follow up with your healthcare provider as directed. He or she may test to make sure the infection has cleared. If needed, more treatment may be started.  Date Last Reviewed: 1/1/2017 2000-2019 [employer]. [address] [place] [address], [place] [address]. All rights reserved. This information is not intended as a substitute for professional medical care. Always follow your healthcare professional's instructions.

## 2020-08-23 NOTE — PROGRESS NOTES
"Patient presents with:  Urgent Care: uti    SUBJECTIVE:   Carmen Carvajal is a 60 year old female who  presents today for a possible UTI. Symptoms of dysuria, urgency and frequency have been going on for 1week(s).  Hematuria no.  gradual onset and worsening in the past couple of days.  There is no history of fever, chills, nausea or vomiting.  No history of vaginal or penile discharge. This patient does have a history of urinary tract infections. Patient denies long duration, rigors, flank pain, temperature > 101 degrees F. and Vomiting, significant nausea or diarrhea.     Past Medical History:   Diagnosis Date     Hypertension      Retinal detachment     right eye retina tear repair      There is no problem list on file for this patient.    Social History     Tobacco Use     Smoking status: Never Smoker     Smokeless tobacco: Never Used   Substance Use Topics     Alcohol use: No       ROS:   CONSTITUTIONAL:NEGATIVE for fever, chills, change in weight  INTEGUMENTARY/SKIN: NEGATIVE for worrisome rashes, moles or lesions  RESP:NEGATIVE for significant cough or SOB  CV: NEGATIVE for chest pain, palpitations or peripheral edema  GI: NEGATIVE for nausea, abdominal pain, heartburn, or change in bowel habits  : as per HPI  Review of systems negative except as stated above.    OBJECTIVE:  /77 (BP Location: Right arm, Patient Position: Sitting, Cuff Size: Adult Regular)   Pulse 69   Temp 100  F (37.8  C) (Tympanic)   Resp 16   Ht 1.664 m (5' 5.5\")   Wt 80.7 kg (178 lb)   SpO2 96%   BMI 29.17 kg/m    GENERAL APPEARANCE: healthy, alert and no distress  ABDOMEN:  soft, nontender, no HSM or masses and bowel sounds normal  BACK: No CVA tenderness  SKIN: no suspicious lesions or rashes    ASSESSMENT:   Lower, uncomplicated urinary tract infection.    PLAN:  OMNICEF  As per ordered above  Drink plenty of fluids.  Prevention and treatment of UTI's discussed.Signs and symptoms of pyelonephritis mentioned.  Follow up " with primary care physician if not improving

## 2020-08-24 LAB
BACTERIA SPEC CULT: ABNORMAL
SPECIMEN SOURCE: ABNORMAL

## 2020-10-02 ENCOUNTER — OFFICE VISIT (OUTPATIENT)
Dept: URGENT CARE | Facility: URGENT CARE | Age: 60
End: 2020-10-02
Payer: COMMERCIAL

## 2020-10-02 VITALS
HEART RATE: 71 BPM | TEMPERATURE: 98.6 F | WEIGHT: 178 LBS | BODY MASS INDEX: 29.17 KG/M2 | OXYGEN SATURATION: 97 % | RESPIRATION RATE: 16 BRPM | SYSTOLIC BLOOD PRESSURE: 132 MMHG | DIASTOLIC BLOOD PRESSURE: 80 MMHG

## 2020-10-02 DIAGNOSIS — R60.0 BILATERAL LEG EDEMA: Primary | ICD-10-CM

## 2020-10-02 LAB
ANION GAP SERPL CALCULATED.3IONS-SCNC: 5 MMOL/L (ref 3–14)
BUN SERPL-MCNC: 25 MG/DL (ref 7–30)
CALCIUM SERPL-MCNC: 10 MG/DL (ref 8.5–10.1)
CHLORIDE SERPL-SCNC: 106 MMOL/L (ref 94–109)
CO2 SERPL-SCNC: 26 MMOL/L (ref 20–32)
CREAT SERPL-MCNC: 0.76 MG/DL (ref 0.52–1.04)
GFR SERPL CREATININE-BSD FRML MDRD: 83 ML/MIN/{1.73_M2}
GLUCOSE SERPL-MCNC: 91 MG/DL (ref 70–99)
POTASSIUM SERPL-SCNC: 3.7 MMOL/L (ref 3.4–5.3)
SODIUM SERPL-SCNC: 137 MMOL/L (ref 133–144)

## 2020-10-02 PROCEDURE — 99213 OFFICE O/P EST LOW 20 MIN: CPT | Performed by: PHYSICIAN ASSISTANT

## 2020-10-02 PROCEDURE — 36415 COLL VENOUS BLD VENIPUNCTURE: CPT | Performed by: PHYSICIAN ASSISTANT

## 2020-10-02 PROCEDURE — 80048 BASIC METABOLIC PNL TOTAL CA: CPT | Performed by: PHYSICIAN ASSISTANT

## 2020-10-02 NOTE — PROGRESS NOTES
"SUBJECTIVE:   Carmen Carvajal is a 60 year old female presenting with a chief complaint of   Chief Complaint   Patient presents with     Musculoskeletal Problem     Pt states her legs feels rubbery/weak/stiff X 1 day       She is an established patient of [facility].  Patient presents with complaints to LE feelings of pressure and \"rubbery\" feeling.  Patient denies any CP or SOB.  She states she has no pains but feels like she wants to sit down.      Review of Systems   Musculoskeletal:        Bilateral lower leg pressure and rubbery feeling.     All other systems reviewed and are negative.      Past Medical History:   Diagnosis Date     Hypertension      Retinal detachment     right eye retina tear repair      Family History   Problem Relation Age of Onset     Retinal detachment Mother      Current Outpatient Medications   Medication Sig Dispense Refill     AMLODIPINE BESYLATE PO Take 10 mg by mouth daily       Cholecalciferol (VITAMIN D3 PO) Take 2,000 Units by mouth daily       LEVOTHYROXINE SODIUM PO Take 125 mcg by mouth daily       losartan-hydrochlorothiazide (HYZAAR) 100-12.5 MG per tablet Take 1 tablet by mouth daily       methylcellulose (CITRUCEL) 500 MG TABS Take 500 mg by mouth daily       multivitamin, therapeutic (THERA-VIT) TABS Take 1 tablet by mouth daily       risperiDONE (RISPERDAL) 2 MG tablet        vitamin  B complex with vitamin C (VITAMIN  B COMPLEX) TABS Take 1 tablet by mouth daily       Social History     Tobacco Use     Smoking status: Never Smoker     Smokeless tobacco: Never Used   Substance Use Topics     Alcohol use: No       OBJECTIVE  /80   Pulse 71   Temp 98.6  F (37  C) (Temporal)   Resp 16   Wt 80.7 kg (178 lb)   SpO2 97%   BMI 29.17 kg/m      Physical Exam  Vitals signs and nursing note reviewed.   Constitutional:       Appearance: Normal appearance. She is obese.   HENT:      Head: Normocephalic and atraumatic.   Eyes:      Extraocular Movements: Extraocular " movements intact.      Conjunctiva/sclera: Conjunctivae normal.   Cardiovascular:      Rate and Rhythm: Normal rate and regular rhythm.      Pulses: Normal pulses.      Heart sounds: Normal heart sounds.   Pulmonary:      Effort: Pulmonary effort is normal.      Breath sounds: Normal breath sounds.   Musculoskeletal:         General: Swelling present.      Comments: Negative martha bilaterally.  1/4 pitting edema.     Skin:     General: Skin is warm and dry.      Capillary Refill: Capillary refill takes less than 2 seconds.   Neurological:      General: No focal deficit present.      Mental Status: She is alert.   Psychiatric:         Mood and Affect: Mood normal.         Behavior: Behavior normal.         Labs:  Results for orders placed or performed in visit on 10/02/20 (from the past 24 hour(s))   Basic metabolic panel  (Ca, Cl, CO2, Creat, Gluc, K, Na, BUN)   Result Value Ref Range    Sodium 137 133 - 144 mmol/L    Potassium 3.7 3.4 - 5.3 mmol/L    Chloride 106 94 - 109 mmol/L    Carbon Dioxide 26 20 - 32 mmol/L    Anion Gap 5 3 - 14 mmol/L    Glucose 91 70 - 99 mg/dL    Urea Nitrogen 25 7 - 30 mg/dL    Creatinine 0.76 0.52 - 1.04 mg/dL    GFR Estimate 83 >60 mL/min/[1.73_m2]    GFR Estimate If Black >90 >60 mL/min/[1.73_m2]    Calcium 10.0 8.5 - 10.1 mg/dL       X-Ray was not done.    ASSESSMENT:      ICD-10-CM    1. Bilateral leg edema  R60.0 Basic metabolic panel  (Ca, Cl, CO2, Creat, Gluc, K, Na, BUN)     Compression Sleeve/Stocking Order for DME - ONLY FOR DME        Medical Decision Making:    Differential Diagnosis:  edema    Serious Comorbid Conditions:  Adult:  as above    PLAN:    Compression stockings.  F/U with PCP.  Compression stockings ordered.      Followup:    If not improving or if condition worsens, follow up with your Primary Care Provider, If not improving or if conditions worsens over the next 12-24 hours, go to the Emergency Department    Patient Instructions     Patient Education     Leg  Swelling in Both Legs    Swelling of the feet, ankles, and legs is called edema. It is caused by excess fluid that has collected in the tissues. Extra fluid in the body settles in the lowest part because of gravity. This is why the legs and feet are most affected.  Some of the causes for edema include:    Disease of the heart like congestive heart failure    Standing or sitting for long periods of time    Infection of the feet or legs    Blood pooling in the veins of your legs (venous insufficiency)    Dilated veins in your lower leg (varicose veins)    Garters or other clothing that is tight on your legs. This will cause blood to pool in your legs because the clothing limits blood flow.    Some medicines such as hormones like birth control pills, some blood pressure medicines like calcium channel blockers (amlodipine) and steroids, some antidepressants like MAO inhibitors and tricyclics    Menstrual periods that cause you to retain fluids    Many types of renal disease    Liver failure or cirrhosis    Pregnancy, some swelling is normal, but a sudden increase in leg swelling or weight gain can be a sign of a dangerous complication of pregnancy    Poor nutrition    Thyroid disease  Medical treatment will depend on what is causing the swelling in your legs. Your healthcare provider may prescribe water pills (diuretics) to get rid of the extra fluid.  Home care  Follow these guidelines when caring for yourself at home:    Don't wear clothing like garters that is tight on your legs.    Keep your legs up while lying or sitting.    If infection, injury, or recent surgery is causing the swelling, stay off your legs as much as possible until symptoms get better.    If your healthcare provider says that your leg swelling is caused by venous insufficiency or varicose veins, don't sit or  one place for long periods of time. Take breaks and walk about every few hours. Brisk walking is a good exercise. It helps circulate  the blood that has collected in your leg. Talk with your provider about using support stockings to stop daytime leg swelling.    If your provider says that heart disease is causing your leg swelling, follow a low-salt diet to stop extra fluid from staying in your body. You may also need medicine.  Follow-up care  Follow up with your healthcare provider, or as advised.  When to seek medical advice  Call your healthcare provider right away if any of these occur:    New shortness of breath or chest pain    Shortness of breath or chest pain that gets worse    Swelling in both legs or ankles that gets worse    Swelling of the abdomen    Redness, warmth, or swelling in one leg    Fever of 100.4 F (38 C) or higher, or as directed by your healthcare provider    Yellow color to your skin or eyes    Rapid, unexplained weight gain    Having to sleep upright or use an increased number of pillows  Date Last Reviewed: 3/31/2016    0216-7750 [employer]. [address] [place] [address], [place] [address]. All rights reserved. This information is not intended as a substitute for professional medical care. Always follow your healthcare professional's instructions.

## 2020-10-02 NOTE — PATIENT INSTRUCTIONS
Patient Education     Leg Swelling in Both Legs    Swelling of the feet, ankles, and legs is called edema. It is caused by excess fluid that has collected in the tissues. Extra fluid in the body settles in the lowest part because of gravity. This is why the legs and feet are most affected.  Some of the causes for edema include:    Disease of the heart like congestive heart failure    Standing or sitting for long periods of time    Infection of the feet or legs    Blood pooling in the veins of your legs (venous insufficiency)    Dilated veins in your lower leg (varicose veins)    Garters or other clothing that is tight on your legs. This will cause blood to pool in your legs because the clothing limits blood flow.    Some medicines such as hormones like birth control pills, some blood pressure medicines like calcium channel blockers (amlodipine) and steroids, some antidepressants like MAO inhibitors and tricyclics    Menstrual periods that cause you to retain fluids    Many types of renal disease    Liver failure or cirrhosis    Pregnancy, some swelling is normal, but a sudden increase in leg swelling or weight gain can be a sign of a dangerous complication of pregnancy    Poor nutrition    Thyroid disease  Medical treatment will depend on what is causing the swelling in your legs. Your healthcare provider may prescribe water pills (diuretics) to get rid of the extra fluid.  Home care  Follow these guidelines when caring for yourself at home:    Don't wear clothing like garters that is tight on your legs.    Keep your legs up while lying or sitting.    If infection, injury, or recent surgery is causing the swelling, stay off your legs as much as possible until symptoms get better.    If your healthcare provider says that your leg swelling is caused by venous insufficiency or varicose veins, don't sit or  one place for long periods of time. Take breaks and walk about every few hours. Brisk walking is a good  exercise. It helps circulate the blood that has collected in your leg. Talk with your provider about using support stockings to stop daytime leg swelling.    If your provider says that heart disease is causing your leg swelling, follow a low-salt diet to stop extra fluid from staying in your body. You may also need medicine.  Follow-up care  Follow up with your healthcare provider, or as advised.  When to seek medical advice  Call your healthcare provider right away if any of these occur:    New shortness of breath or chest pain    Shortness of breath or chest pain that gets worse    Swelling in both legs or ankles that gets worse    Swelling of the abdomen    Redness, warmth, or swelling in one leg    Fever of 100.4 F (38 C) or higher, or as directed by your healthcare provider    Yellow color to your skin or eyes    Rapid, unexplained weight gain    Having to sleep upright or use an increased number of pillows  Date Last Reviewed: 3/31/2016    8313-8368 [employer]. [address] [place] [address], [place] [address]. All rights reserved. This information is not intended as a substitute for professional medical care. Always follow your healthcare professional's instructions.

## 2020-12-29 ENCOUNTER — OFFICE VISIT (OUTPATIENT)
Dept: URGENT CARE | Facility: URGENT CARE | Age: 60
End: 2020-12-29
Payer: COMMERCIAL

## 2020-12-29 VITALS
RESPIRATION RATE: 16 BRPM | WEIGHT: 187 LBS | BODY MASS INDEX: 30.65 KG/M2 | HEART RATE: 85 BPM | DIASTOLIC BLOOD PRESSURE: 83 MMHG | TEMPERATURE: 97.8 F | OXYGEN SATURATION: 97 % | SYSTOLIC BLOOD PRESSURE: 129 MMHG

## 2020-12-29 DIAGNOSIS — B96.89 BV (BACTERIAL VAGINOSIS): Primary | ICD-10-CM

## 2020-12-29 DIAGNOSIS — R30.0 DYSURIA: ICD-10-CM

## 2020-12-29 DIAGNOSIS — N76.0 BV (BACTERIAL VAGINOSIS): Primary | ICD-10-CM

## 2020-12-29 PROBLEM — M85.80 OSTEOPENIA: Status: ACTIVE | Noted: 2020-12-29

## 2020-12-29 PROBLEM — F41.1 GAD (GENERALIZED ANXIETY DISORDER): Status: ACTIVE | Noted: 2020-08-04

## 2020-12-29 PROBLEM — D12.6 TUBULAR ADENOMA OF COLON: Status: ACTIVE | Noted: 2020-12-29

## 2020-12-29 PROBLEM — K57.30 DIVERTICULOSIS OF LARGE INTESTINE: Status: ACTIVE | Noted: 2020-12-29

## 2020-12-29 PROBLEM — K58.9 IRRITABLE BOWEL SYNDROME: Status: ACTIVE | Noted: 2020-12-29

## 2020-12-29 PROBLEM — I10 ESSENTIAL HYPERTENSION: Status: ACTIVE | Noted: 2020-12-29

## 2020-12-29 PROBLEM — E03.9 ACQUIRED HYPOTHYROIDISM: Status: ACTIVE | Noted: 2020-12-29

## 2020-12-29 PROBLEM — I87.2 VENOUS INSUFFICIENCY: Status: ACTIVE | Noted: 2020-10-21

## 2020-12-29 LAB
ALBUMIN UR-MCNC: NEGATIVE MG/DL
APPEARANCE UR: CLEAR
BILIRUB UR QL STRIP: NEGATIVE
COLOR UR AUTO: YELLOW
GLUCOSE UR STRIP-MCNC: NEGATIVE MG/DL
HGB UR QL STRIP: NEGATIVE
KETONES UR STRIP-MCNC: NEGATIVE MG/DL
LEUKOCYTE ESTERASE UR QL STRIP: NEGATIVE
NITRATE UR QL: NEGATIVE
PH UR STRIP: 6.5 PH (ref 5–7)
SOURCE: NORMAL
SP GR UR STRIP: 1.01 (ref 1–1.03)
SPECIMEN SOURCE: ABNORMAL
UROBILINOGEN UR STRIP-ACNC: 0.2 EU/DL (ref 0.2–1)
WET PREP SPEC: ABNORMAL

## 2020-12-29 PROCEDURE — 87086 URINE CULTURE/COLONY COUNT: CPT | Performed by: PHYSICIAN ASSISTANT

## 2020-12-29 PROCEDURE — 99213 OFFICE O/P EST LOW 20 MIN: CPT | Performed by: PHYSICIAN ASSISTANT

## 2020-12-29 PROCEDURE — 87210 SMEAR WET MOUNT SALINE/INK: CPT | Performed by: PHYSICIAN ASSISTANT

## 2020-12-29 PROCEDURE — 81003 URINALYSIS AUTO W/O SCOPE: CPT | Performed by: PHYSICIAN ASSISTANT

## 2020-12-29 RX ORDER — METRONIDAZOLE 500 MG/1
500 TABLET ORAL 2 TIMES DAILY
Qty: 14 TABLET | Refills: 0 | Status: SHIPPED | OUTPATIENT
Start: 2020-12-29 | End: 2021-01-05

## 2020-12-29 ASSESSMENT — ENCOUNTER SYMPTOMS
VOMITING: 0
ACTIVITY CHANGE: 0
ABDOMINAL PAIN: 0
POLYDIPSIA: 0
CONSTITUTIONAL NEGATIVE: 1
RHINORRHEA: 0
MUSCULOSKELETAL NEGATIVE: 1
PALPITATIONS: 0
CARDIOVASCULAR NEGATIVE: 1
HEADACHES: 0
COUGH: 0
NEUROLOGICAL NEGATIVE: 1
LIGHT-HEADEDNESS: 0
GASTROINTESTINAL NEGATIVE: 1
NECK PAIN: 0
ADENOPATHY: 0
DIZZINESS: 0
FATIGUE: 0
FLANK PAIN: 0
WEAKNESS: 0
NAUSEA: 0
SORE THROAT: 0
RESPIRATORY NEGATIVE: 1
FEVER: 0
CHILLS: 0
FREQUENCY: 1
NECK STIFFNESS: 0
SHORTNESS OF BREATH: 0
HEMATURIA: 0
DYSURIA: 1
ENDOCRINE NEGATIVE: 1
DIARRHEA: 0
MYALGIAS: 0

## 2020-12-29 NOTE — NURSING NOTE
"Vital signs:  Temp: 97.8  F (36.6  C) Temp src: Oral BP: 129/83 Pulse: 85   Resp: 16 SpO2: 97 %       Weight: 84.8 kg (187 lb)  Estimated body mass index is 30.65 kg/m  as calculated from the following:    Height as of 8/23/20: 1.664 m (5' 5.5\").    Weight as of this encounter: 84.8 kg (187 lb).          "

## 2020-12-29 NOTE — PATIENT INSTRUCTIONS

## 2020-12-29 NOTE — PROGRESS NOTES
Chief Complaint:    Chief Complaint   Patient presents with     Dysuria     urgency, doesn't feel right. sx for a few days- hx of UTI's.        HPI:  Carmen Carvajal is a 60 year old female who has symptoms of urinary dysuria and urgency for 2 day(s).  she denies back pain, nausea, vomiting, fever and chills, flank pain, vaginal discharge, and vaginal odor.    ROS:      Review of Systems   Constitutional: Negative.  Negative for activity change, chills, fatigue and fever.   HENT: Negative for congestion, ear pain, rhinorrhea and sore throat.    Respiratory: Negative.  Negative for cough and shortness of breath.    Cardiovascular: Negative.  Negative for chest pain and palpitations.   Gastrointestinal: Negative.  Negative for abdominal pain, diarrhea, nausea and vomiting.   Endocrine: Negative.  Negative for polydipsia and polyuria.   Genitourinary: Positive for dysuria, frequency and urgency. Negative for flank pain, hematuria, pelvic pain, vaginal discharge and vaginal pain.   Musculoskeletal: Negative.  Negative for myalgias, neck pain and neck stiffness.   Allergic/Immunologic: Negative for immunocompromised state.   Neurological: Negative.  Negative for dizziness, weakness, light-headedness and headaches.   Hematological: Negative for adenopathy.       Family History   Family History   Problem Relation Age of Onset     Retinal detachment Mother         Problem history  Patient Active Problem List   Diagnosis     Acquired hypothyroidism     Diverticulosis of large intestine     Essential hypertension     SKYLA (generalized anxiety disorder)     H/O psychosis     Irritable bowel syndrome     Osteopenia     Other hyperlipidemia     Psychosis (H)     Tubular adenoma of colon     Venous insufficiency        Allergies  Allergies   Allergen Reactions     Ace Inhibitors Cough     Verified in Meditech: Y, Severity in Meditech: S  HUT Comment: Verified in Meditech: Y, Severity in Meditech: S; HUT Reaction: Cough           Social History  Social History     Socioeconomic History     Marital status:      Spouse name: Not on file     Number of children: Not on file     Years of education: Not on file     Highest education level: Not on file   Occupational History     Not on file   Social Needs     Financial resource strain: Not on file     Food insecurity     Worry: Not on file     Inability: Not on file     Transportation needs     Medical: Not on file     Non-medical: Not on file   Tobacco Use     Smoking status: Never Smoker     Smokeless tobacco: Never Used   Substance and Sexual Activity     Alcohol use: No     Drug use: Not on file     Sexual activity: Not on file   Lifestyle     Physical activity     Days per week: Not on file     Minutes per session: Not on file     Stress: Not on file   Relationships     Social connections     Talks on phone: Not on file     Gets together: Not on file     Attends Sikhism service: Not on file     Active member of club or organization: Not on file     Attends meetings of clubs or organizations: Not on file     Relationship status: Not on file     Intimate partner violence     Fear of current or ex partner: Not on file     Emotionally abused: Not on file     Physically abused: Not on file     Forced sexual activity: Not on file   Other Topics Concern     Not on file   Social History Narrative     Not on file        Current Meds    Current Outpatient Medications:      AMLODIPINE BESYLATE PO, Take 10 mg by mouth daily, Disp: , Rfl:      Cholecalciferol (VITAMIN D3 PO), Take 2,000 Units by mouth daily, Disp: , Rfl:      LEVOTHYROXINE SODIUM PO, Take 125 mcg by mouth daily, Disp: , Rfl:      losartan-hydrochlorothiazide (HYZAAR) 100-12.5 MG per tablet, Take 1 tablet by mouth daily, Disp: , Rfl:      methylcellulose (CITRUCEL) 500 MG TABS, Take 500 mg by mouth daily, Disp: , Rfl:      metroNIDAZOLE (FLAGYL) 500 MG tablet, Take 1 tablet (500 mg) by mouth 2 times daily for 7 days, Disp: 14  tablet, Rfl: 0     multivitamin, therapeutic (THERA-VIT) TABS, Take 1 tablet by mouth daily, Disp: , Rfl:      risperiDONE (RISPERDAL) 2 MG tablet, , Disp: , Rfl:      vitamin  B complex with vitamin C (VITAMIN  B COMPLEX) TABS, Take 1 tablet by mouth daily, Disp: , Rfl:      OBJECTIVE     Vital signs noted and reviewed by Robert Chaudhari PA-C  /83   Pulse 85   Temp 97.8  F (36.6  C) (Oral)   Resp 16   Wt 84.8 kg (187 lb)   SpO2 97%   BMI 30.65 kg/m       Physical Exam  Vitals signs and nursing note reviewed.   Constitutional:       General: She is not in acute distress.     Appearance: Normal appearance. She is well-developed. She is not ill-appearing, toxic-appearing or diaphoretic.   HENT:      Head: Normocephalic and atraumatic.      Right Ear: Tympanic membrane and external ear normal.      Left Ear: Tympanic membrane and external ear normal.   Eyes:      Pupils: Pupils are equal, round, and reactive to light.   Neck:      Musculoskeletal: Normal range of motion and neck supple.   Cardiovascular:      Rate and Rhythm: Normal rate and regular rhythm.      Heart sounds: Normal heart sounds. No murmur. No friction rub. No gallop.    Pulmonary:      Effort: Pulmonary effort is normal. No respiratory distress.      Breath sounds: Normal breath sounds. No wheezing or rales.   Chest:      Chest wall: No tenderness.   Abdominal:      General: Bowel sounds are normal. There is no distension.      Palpations: Abdomen is soft. Abdomen is not rigid. There is no mass.      Tenderness: There is no abdominal tenderness. There is no guarding or rebound. Negative signs include Haro's sign and McBurney's sign.   Lymphadenopathy:      Cervical: No cervical adenopathy.   Skin:     General: Skin is warm and dry.   Neurological:      Mental Status: She is alert and oriented to person, place, and time.      Cranial Nerves: No cranial nerve deficit.      Deep Tendon Reflexes: Reflexes are normal and symmetric.    Psychiatric:         Behavior: Behavior normal. Behavior is cooperative.         Thought Content: Thought content normal.         Judgment: Judgment normal.               Labs:     Results for orders placed or performed in visit on 12/29/20   *UA reflex to Microscopic and Culture ([place] and [facility] (except [place] and [place])     Status: None    Specimen: Midstream Urine   Result Value Ref Range    Color Urine Yellow     Appearance Urine Clear     Glucose Urine Negative NEG^Negative mg/dL    Bilirubin Urine Negative NEG^Negative    Ketones Urine Negative NEG^Negative mg/dL    Specific Gravity Urine 1.010 1.003 - 1.035    Blood Urine Negative NEG^Negative    pH Urine 6.5 5.0 - 7.0 pH    Protein Albumin Urine Negative NEG^Negative mg/dL    Urobilinogen Urine 0.2 0.2 - 1.0 EU/dL    Nitrite Urine Negative NEG^Negative    Leukocyte Esterase Urine Negative NEG^Negative    Source Midstream Urine    Wet prep     Status: Abnormal    Specimen: Vagina   Result Value Ref Range    Specimen Description Vagina     Wet Prep No Trichomonas seen     Wet Prep Clue cells seen  Rare   (A)     Wet Prep No yeast seen     Wet Prep WBC'S seen  Moderate              ASSESSMENT     1. BV (bacterial vaginosis)    2. Dysuria           PLAN    Urinalysis discussed with patient.  This was unremarkable for UTI.    We will call with culture results when available.  Will hold off on antibiotic until culture results are known.  Wet prep was positive for clue cells.  Rx for Flagyl for BV,  Treatment currentguidelines - also push fluids, may use Pyridium OTC prn.   Follow up with PCP in 2-3 days if symptoms are not improving.  Worrisome symptoms discussed with instructions to go to the ED.  Patient verbalized understanding and agreed with this plan.          Robert Chaudhari PA-C  12/29/2020, 1:47 PM

## 2020-12-31 LAB
BACTERIA SPEC CULT: NO GROWTH
Lab: NORMAL
SPECIMEN SOURCE: NORMAL

## 2023-02-22 ENCOUNTER — TRANSCRIBE ORDERS (OUTPATIENT)
Dept: OTHER | Age: 63
End: 2023-02-22

## 2023-02-22 DIAGNOSIS — G25.3 MYOCLONUS: Primary | ICD-10-CM

## 2023-03-28 ENCOUNTER — OFFICE VISIT (OUTPATIENT)
Dept: NEUROLOGY | Facility: CLINIC | Age: 63
End: 2023-03-28
Attending: INTERNAL MEDICINE
Payer: COMMERCIAL

## 2023-03-28 ENCOUNTER — TELEPHONE (OUTPATIENT)
Dept: NEUROLOGY | Facility: CLINIC | Age: 63
End: 2023-03-28

## 2023-03-28 VITALS — SYSTOLIC BLOOD PRESSURE: 146 MMHG | HEART RATE: 75 BPM | OXYGEN SATURATION: 98 % | DIASTOLIC BLOOD PRESSURE: 82 MMHG

## 2023-03-28 DIAGNOSIS — T43.505A NEUROLEPTIC-INDUCED PARKINSONISM (H): ICD-10-CM

## 2023-03-28 DIAGNOSIS — G21.11 NEUROLEPTIC-INDUCED PARKINSONISM (H): ICD-10-CM

## 2023-03-28 DIAGNOSIS — R26.9 GAIT DISORDER: ICD-10-CM

## 2023-03-28 DIAGNOSIS — G25.9 MOVEMENT DISORDER: Primary | ICD-10-CM

## 2023-03-28 PROCEDURE — 99205 OFFICE O/P NEW HI 60 MIN: CPT | Performed by: PSYCHIATRY & NEUROLOGY

## 2023-03-28 RX ORDER — AMANTADINE HYDROCHLORIDE 100 MG/1
100 CAPSULE, GELATIN COATED ORAL 2 TIMES DAILY
Qty: 180 CAPSULE | Refills: 3 | Status: SHIPPED | OUTPATIENT
Start: 2023-03-28 | End: 2023-04-17

## 2023-03-28 RX ORDER — ARIPIPRAZOLE 10 MG/1
2.5 TABLET ORAL DAILY
COMMUNITY
Start: 2021-05-07

## 2023-03-28 RX ORDER — ASCORBIC ACID 500 MG
1000 TABLET ORAL
COMMUNITY

## 2023-03-28 RX ORDER — SERTRALINE HYDROCHLORIDE 100 MG/1
100 TABLET, FILM COATED ORAL DAILY
COMMUNITY
Start: 2021-05-07

## 2023-03-28 ASSESSMENT — UNIFIED PARKINSONS DISEASE RATING SCALE (UPDRS)
LEG_AGILITY_RIGHT: (1) SLIGHT: ANY OF THE FOLLOWING: A) THE REGULAR RHYTHM IS BROKEN WITH ONE WITH ONE OR TWO INTERRUPTIONS OR HESITATIONS OF THE MOVEMENT  B) SLIGHT SLOWING  C) THE AMPLITUDE DECREMENTS NEAR THE END OF THE 10 MOVEMENTS.
AMPLITUDE_RLE: (0) NORMAL: NO TREMOR.
RIGIDITY_LLE: (0) NORMAL: NO RIGIDITY.
TOETAPPING_RIGHT: (1) SLIGHT: ANY OF THE FOLLOWING: A) THE REGULAR RHYTHM IS BROKEN WITH ONE WITH ONE OR TWO INTERRUPTIONS OR HESITATIONS OF THE MOVEMENT  B) SLIGHT SLOWING  C) THE AMPLITUDE DECREMENTS NEAR THE END OF THE 10 MOVEMENTS.
LEG_AGILITY_LEFT: (1) SLIGHT: ANY OF THE FOLLOWING: A) THE REGULAR RHYTHM IS BROKEN WITH ONE WITH ONE OR TWO INTERRUPTIONS OR HESITATIONS OF THE MOVEMENT  B) SLIGHT SLOWING  C) THE AMPLITUDE DECREMENTS NEAR THE END OF THE 10 MOVEMENTS.
FINGER_TAPPING_RIGHT: (1) SLIGHT: ANY OF THE FOLLOWING: A) THE REGULAR RHYTHM IS BROKEN WITH ONE WITH ONE OR TWO INTERRUPTIONS OR HESITATIONS OF THE MOVEMENT  B) SLIGHT SLOWING  C) THE AMPLITUDE DECREMENTS NEAR THE END OF THE 10 MOVEMENTS.
CONSTANCY_TREMOR_ATREST: (0) NORMAL: NO TREMOR.
AMPLITUDE_LUE: (0) NORMAL: NO TREMOR.
TOETAPPING_LEFT: (2) MILD: ANY OF THE FOLLOWING: A) 3 TO 5 INTERRUPTIONS DURING TAPPING  B) MILD SLOWING  C) THE AMPLITUDE DECREMENTS MIDWAY IN THE 10-MOVEMENT SEQUENCE.
TOTAL_SCORE: 5
PRONATION_SUPINATION_LEFT: (1) SLIGHT: ANY OF THE FOLLOWING: A) THE REGULAR RHYTHM IS BROKEN WITH ONE WITH ONE OR TWO INTERRUPTIONS OR HESITATIONS OF THE MOVEMENT  B) SLIGHT SLOWING  C) THE AMPLITUDE DECREMENTS NEAR THE END OF THE 10 MOVEMENTS.
RIGIDITY_RUE: (0) NORMAL: NO RIGIDITY.
DYSKINESIAS_PRESENT: NO
SPONTANEITY_OF_MOVEMENT: (1) SLIGHT: SLIGHT GLOBAL SLOWNESS AND POVERTY OF SPONTANEOUS MOVEMENTS.
RIGIDITY_RLE: (0) NORMAL: NO RIGIDITY.
RIGIDITY_NECK: (0) NORMAL: NO RIGIDITY.
HANDMOVEMENTS_RIGHT: (1) SLIGHT: ANY OF THE FOLLOWING: A) THE REGULAR RHYTHM IS BROKEN WITH ONE WITH ONE OR TWO INTERRUPTIONS OR HESITATIONS OF THE MOVEMENT  B) SLIGHT SLOWING  C) THE AMPLITUDE DECREMENTS NEAR THE END OF THE 10 MOVEMENTS.
POSTURE: (1) SLIGHT: NOT QUITE ERECT, BUT COULD BE NORMAL FOR OLDER PERSONS.
PARKINSONS_MEDS: OFF
RIGIDITY_LUE: (0) NORMAL: NO RIGIDITY.
AMPLITUDE_RUE: (0) NORMAL: NO TREMOR.
HANDMOVEMENTS_LEFT: (1) SLIGHT: ANY OF THE FOLLOWING: A) THE REGULAR RHYTHM IS BROKEN WITH ONE WITH ONE OR TWO INTERRUPTIONS OR HESITATIONS OF THE MOVEMENT  B) SLIGHT SLOWING  C) THE AMPLITUDE DECREMENTS NEAR THE END OF THE 10 MOVEMENTS.
AMPLITUDE_LIP_JAW: (0) NORMAL: NO TREMOR.
GAIT: (1) SLIGHT: INDEPENDENT WALKING WITH MINOR GAIT IMPAIRMENT.
FACIAL_EXPRESSION: (1) SLIGHT: MINIMAL MASKED FACIES MANIFESTED ONLY BY DECREASED FREQUENCY OF BLINKING.
PRONATION_SUPINATION_RIGHT: (1) SLIGHT: ANY OF THE FOLLOWING: A) THE REGULAR RHYTHM IS BROKEN WITH ONE WITH ONE OR TWO INTERRUPTIONS OR HESITATIONS OF THE MOVEMENT  B) SLIGHT SLOWING  C) THE AMPLITUDE DECREMENTS NEAR THE END OF THE 10 MOVEMENTS.
ARISING_CHAIR: (0) NORMAL: NO PROBLEMS. ABLE TO ARISE QUICKLY WITHOUT HESITATION.
POSTURAL_STABILITY: (1) SLIGHT: 3-5 STEPS, BUT RECOVERS UNAIDED.
AMPLITUDE_LLE: (0) NORMAL: NO TREMOR.
AXIAL_SCORE: 5
MOVEMENTS_INTERFERE_WITH_RATINGS: NO
TOTAL_SCORE_LEFT: 7
SPEECH: (0) NORMAL: NO SPEECH PROBLEMS.
FREEZING_GAIT: (0) NORMAL: NO FREEZING.
FINGER_TAPPING_LEFT: (2) MILD: ANY OF THE FOLLOWING: A) 3 TO 5 INTERRUPTIONS DURING TAPPING  B) MILD SLOWING  C) THE AMPLITUDE DECREMENTS MIDWAY IN THE 10-MOVEMENT SEQUENCE.
TOTAL_SCORE: 17

## 2023-03-28 ASSESSMENT — PAIN SCALES - GENERAL: PAINLEVEL: NO PAIN (0)

## 2023-03-28 NOTE — NURSING NOTE
"Carmen Carvajal is a 63 year old female who presents for:  Chief Complaint   Patient presents with     Parkinson     New Movement Disorder, Myoclonus, referred by Carolyn Duong, patient scheduled, records in Epic        Initial Vitals:  BP (!) 146/82   Pulse 75   SpO2 98%  Estimated body mass index is 30.65 kg/m  as calculated from the following:    Height as of 8/23/20: 1.664 m (5' 5.5\").    Weight as of 12/29/20: 84.8 kg (187 lb).. There is no height or weight on file to calculate BSA. BP completed using cuff size: regular  No Pain (0)    Nursing Comments:     Era Pedroza MA  "

## 2023-03-28 NOTE — PROGRESS NOTES
Department of Neurology  Movement Disorders Division   New Patient Visit    Patient: Carmen Carvajal   MRN: [id number]   : 1960   Date of Visit: 3/28/2023  PCP: Carolyn Duong   Referring provider: Ad    CC:  Leg heaviness and difficulties standing up    HPI:  Ms. Carvajal is a 63 year old R-handed female with history significant for reported anxiety, chronically on dopamine blocking agents such as risperal which was stopped 2 years ago, and Abilify, which she's currently taking, follows with a psychiatrist. She also has amlodipine for HTN who presents to movement disorder clinic for heaviness in her legs. She is accompanied by her , Librado, who assists with collateral history.  Around 2.5 years ago, she was working part time on a retail job, and she felt the need to sit down. That seemed to be preceded by an urge, and it would build up and she had a feeling that her knees would buckle. She does endorse some sensation that her legs will shake, and sitting down makes it go away very quickly. She seems to reset and can stand up again, however within a few seconds to a minute that sensation comes back. She also endorses some difficulties walking, as her legs feel heavy. She denies any falls, although recently she had an instance in which she tripped over an area rug and went to the ground and hurt her shoulder.   She denies any postural changes, denies any shuffling. Sitting down or laying down, she does feel that at times her legs would jerk, and she does feel antsy and feels she has to move around. She denies any involuntary movements in her mouth or face.  She denies any symptoms in her upper extremities. No associated rigidity or bradykinesia. She denies any changes in her handwriting, any changes in her hand dexterity, and she denies any issues performing ADLs when it comes to usage of her hands. There have been no speech or swallow changes.   She saw a few different specialists over time.  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 She endorses no benefit from the [facility] at [place].  She also reports having tried gabapentin without any benefit. She also tried primidone and artane without any benefit. She is currently on Lyrica which doesn't seem to be helping.   She's never been attempted to wean off dopamine blocking agents. She feels that from a psychiatric standpoint she feels well-managed. She did have some financial and family stressors leading to panic attacks prior to all of this happening. She also has hypothyroidism and she reports no issues with surveillance labs.   She denies any orthostasis, constipation, bladder problems, RBD, or any other issues.  She denies any chronic chemical occupational exposures. She denies any FH of movement disorders.   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 She is here for a second opinion on the case.       ROS:  All others negative except as listed above. Pertinent movement disorders-specific ROS listed above.    Past Medical History:   Diagnosis Date     Hypertension      Retinal detachment     right eye retina tear repair         Past Surgical History:   Procedure Laterality Date     RETINAL REATTACHMENT      laser retinopexy june and august 2014 right eye     thyroid  1990's    radiation          Current Outpatient Medications:      amantadine (SYMMETREL) 100 MG capsule, Take 1 capsule (100 mg) by mouth 2 times daily, Disp: 180 capsule, Rfl: 3     AMLODIPINE BESYLATE PO, Take 10 mg by mouth daily, Disp: , Rfl:      ARIPiprazole (ABILIFY) 10 MG tablet, Take 10 mg by mouth daily, Disp: , Rfl:      Cholecalciferol (VITAMIN D3 PO), Take 2,000 Units by mouth daily, Disp: , Rfl:      diclofenac (VOLTAREN) 1  % topical gel, , Disp: , Rfl:      LEVOTHYROXINE SODIUM PO, Take 125 mcg by mouth daily, Disp: , Rfl:      losartan-hydrochlorothiazide (HYZAAR) 100-12.5 MG per tablet, Take 1 tablet by mouth daily, Disp: , Rfl:      methylcellulose (CITRUCEL) 500 MG TABS, Take 500 mg by mouth daily, Disp: , Rfl:      multivitamin, therapeutic (THERA-VIT) TABS, Take 1 tablet by mouth daily, Disp: , Rfl:      risperiDONE (RISPERDAL) 2 MG tablet, , Disp: , Rfl:      sertraline (ZOLOFT) 100 MG tablet, Take 100 mg by mouth daily, Disp: , Rfl:      vitamin B complex with vitamin C (STRESS TAB) tablet, Take 1 tablet by mouth daily, Disp: , Rfl:      vitamin C (ASCORBIC ACID) 500 MG tablet, Take 1,000 mg by mouth, Disp: , Rfl:      Allergies   Allergen Reactions     Ace Inhibitors Cough     Verified in Meditech: Y, Severity in Meditech: S  HUT Comment: Verified in Meditech: Y, Severity in Meditech: S; HUT Reaction: Cough          Family History   Problem Relation Age of Onset     Retinal detachment Mother         Social History:  She  reports that she has never smoked. She has never used smokeless tobacco. She reports that she does not drink alcohol.     PHYSICAL EXAM:  BP (!) 146/82   Pulse 75   SpO2 98%      Gen: no acute distress, respirations appeared nonlabored    NEURO:  MS:  Alert, oriented, appropriate    CN:  PERRLA, EOMI, face symmetric, normal strength and sensation, tongue and palate are midline, SCM 5/5 throughout    Motor:    Normal tone, no fasciculations, strength is 5/5 throughout    Sensation:  Preserved to all modalities in all extremities    Coordination:  Normal finger-nose    Reflexes: 2+ throughout, downgoing toes bilaterally     Gait:  She has short steps and reduced bilateral arm swing.    UPDRS Motor Scale 3/28/2023   Time:  9:10 AM   Medication Off   R Brain DBS: None   L Brain DBS: None   Dyskinesia (LID) No   Did LID interfere No   Speech 0   Facial Expression 1   Rigidity Neck 0   Rigidity RUE 0   Rigidity  LUE 0   Rigidity RLE 0   Rigidity LLE 0   Finger Taps R 1   Finger Taps L 2   Hand Mvt R 1   Hand Mvt L 1   Pron-/Supinate R 1   Pron-/Supinate L 1   Toe Tap R 1   Toe Tap L 2   Leg Agility R 1   Leg Agility L 1   Arise From Chair 0   Gait 1   Gait Freezing 0   Postural Stability 1   Posture 1   Global Spont Mvt 1   Postural Tremor RUE 0   Postural Tremor LUE 0   Kinetic Tremor RUE 0   Kinetic Tremor LUE 0   Rest Tremor RUE 0   Rest Tremor LUE 0   Rest Tremor RLE 0   Rest Tremor LLE 0   Rest Tremor Lip/Jaw 0   Rest Tremor Constancy 0   Total Right 5   Total Left 7   Axial Total 5   Total 17         Movement disorders observations: she does have a quite reduced blink rate. She also has decremental bradykinesia in bilateral upper extremities. Some overflow hyperkinetic movements in perioral area and tongue as well as bilateral ankles and toes, a little more pronounced when performing tasks with RUE.    DATA REVIEWED:  Reviewed dispense history available under medication history. Also reviewed notes from Dr San and rehab team at [facility].    ASSESSMENT:  62 yo F with longstanding psychiatric history with chronic exposure to dopamine-blocking agents, with a 2 year history of a progressive gait disorder associated with sensation of leg heaviness and some restlessness when relaxing while sitting or laying down. Those complaints correlated with physical exam findings of overflow involuntary hyperkinetic movements in extremities. Overall picture is concerning for a possible drug-induced phenomenon that resembles a possible tardive presentation with dyskinesias, parkinsonism and potential akathisia.    PLAN:  - Reviewed impression and plan  - Will give a trial of amantadine 100mg BID  - Will reach out to her psychiatrist to get them to comment on a potential wean off of her Abilify if that is deemed appropriate  - If amantadine fails and reducing Abilify also fails, may consider a trial of sinemet targeting her gait.  For the hyperkinetic movements we may also consider a trial of valbenazine or deutetrabenazine in the future  - she hurt her shoulder recently following a fall after tripping on an area rug, and is undergoing PT. Will consider gait/balance PT following that, she will just let us know when she is ready for that.  - Otherwise we will regroup in 4-6 months, sooner if needed, encouraged to call if questions, concerns, or changes.     There are no Patient Instructions on file for this visit.      Andrea Calderon MD (Leo) (he/him/his)   of Neurology  [facility]  Department of Neurology      Thank you for referring Carmen Carvajal to our [facility] Movement Disorders Program, we appreciate the opportunity of being your partner in healthcare. Please feel free to reach out to us at any point if any questions or concerns about this visit.    Attending attestation: Today I spent a total 60 minutes on this case, with greater than 50% of the time spent in face-to-face, examining, obtaining history, providing education and coordination of care. Additional time was spent in chart review, ancillary data, and documentation as delineated above.

## 2023-03-28 NOTE — TELEPHONE ENCOUNTER
Faxed Form/patient OVS notes, AVS, All notes from visit by provider Yumiko CONN March 28, 2023 to fax number [phone]    Right Fax confirmed at 9:42 AM    Era Pedroza MA

## 2023-04-11 ENCOUNTER — TELEPHONE (OUTPATIENT)
Dept: NEUROLOGY | Facility: CLINIC | Age: 63
End: 2023-04-11
Payer: COMMERCIAL

## 2023-04-11 DIAGNOSIS — G25.9 MOVEMENT DISORDER: Primary | ICD-10-CM

## 2023-04-11 NOTE — TELEPHONE ENCOUNTER
Dr. Calderon said we can increase to 3 times daily, if pt is not feeling better.  He would like to follow up on psychiatrist's recommendations of coming off of the Abilify.     Called pt and LDVM on detail line relaying provider message.    Marie CARRILLO RN, BSN  [facility]

## 2023-04-11 NOTE — TELEPHONE ENCOUNTER
Health Call Center    Phone Message    May a detailed message be left on voicemail: yes     Reason for Call: Medication Question or concern regarding medication   Prescription Clarification  Name of Medication: amantadine (SYMMETREL) 100 MG capsule  Prescribing Provider: Dr. Calderon   Pharmacy:    What on the order needs clarification? Carmen is inquiring about how long after taking this medication is she going to feel results. Carmen stated that she has been taking this medication for a couple weeks and has not noticed anything significant. Please call Carmen at your earliest convenience to discuss.    Action Taken: Message routed to:  Other:  NEUROLOGY    Travel Screening: Not Applicable

## 2023-04-11 NOTE — TELEPHONE ENCOUNTER
Per review, pt started amantadine 3/28/23, so it has been 2 weeks since started.      Will route to provider to see if he recommends any changes, or to continue to monitor symptoms.     Marie CARRILLO RN, BSN  [facility]

## 2023-04-13 ENCOUNTER — MYC MEDICAL ADVICE (OUTPATIENT)
Dept: NEUROLOGY | Facility: CLINIC | Age: 63
End: 2023-04-13
Payer: COMMERCIAL

## 2023-04-13 DIAGNOSIS — G25.9 MOVEMENT DISORDER: Primary | ICD-10-CM

## 2023-04-13 RX ORDER — AMANTADINE HYDROCHLORIDE 100 MG/1
100 CAPSULE, GELATIN COATED ORAL 3 TIMES DAILY
Qty: 270 CAPSULE | Refills: 1 | Status: CANCELLED | OUTPATIENT
Start: 2023-04-13

## 2023-04-13 NOTE — TELEPHONE ENCOUNTER
Sarah also sent to patient.   Marie CARRILLO RN, BSN  [facility] Neurology [facility]

## 2023-04-13 NOTE — TELEPHONE ENCOUNTER
Please see [employer] message.     New script pended.     Marie CARRILLO RN, BSN  [facility]

## 2023-04-17 RX ORDER — AMANTADINE HYDROCHLORIDE 100 MG/1
100 CAPSULE, GELATIN COATED ORAL 3 TIMES DAILY
Qty: 270 CAPSULE | Refills: 0 | Status: SHIPPED | OUTPATIENT
Start: 2023-04-17 | End: 2023-10-31

## 2023-04-17 NOTE — TELEPHONE ENCOUNTER
Received myChart message from pt. She would like to increase the medication to 3 times daily.     Medication pended.     Marie CARRILLO RN, BSN  [facility]

## 2023-04-18 NOTE — TELEPHONE ENCOUNTER
Pt notified via [employer] (4/13) that new order placed for TID dosing.     Provider has signed rx. No further action needed.     Woody Roach RN, BSN  [facility] Neurology

## 2023-10-02 ENCOUNTER — OFFICE VISIT (OUTPATIENT)
Dept: NEUROLOGY | Facility: CLINIC | Age: 63
End: 2023-10-02
Payer: COMMERCIAL

## 2023-10-02 VITALS — OXYGEN SATURATION: 97 % | DIASTOLIC BLOOD PRESSURE: 74 MMHG | SYSTOLIC BLOOD PRESSURE: 106 MMHG | HEART RATE: 73 BPM

## 2023-10-02 DIAGNOSIS — R26.9 GAIT DISORDER: ICD-10-CM

## 2023-10-02 DIAGNOSIS — G21.11 NEUROLEPTIC-INDUCED PARKINSONISM (H): Primary | ICD-10-CM

## 2023-10-02 DIAGNOSIS — T43.505A NEUROLEPTIC-INDUCED PARKINSONISM (H): Primary | ICD-10-CM

## 2023-10-02 PROCEDURE — 99214 OFFICE O/P EST MOD 30 MIN: CPT | Performed by: PSYCHIATRY & NEUROLOGY

## 2023-10-02 ASSESSMENT — UNIFIED PARKINSONS DISEASE RATING SCALE (UPDRS)
MOVEMENTS_INTERFERE_WITH_RATINGS: NO
RIGIDITY_NECK: (0) NORMAL: NO RIGIDITY.
AMPLITUDE_RLE: (0) NORMAL: NO TREMOR.
AMPLITUDE_LUE: (0) NORMAL: NO TREMOR.
TOETAPPING_LEFT: (2) MILD: ANY OF THE FOLLOWING: A) 3 TO 5 INTERRUPTIONS DURING TAPPING  B) MILD SLOWING  C) THE AMPLITUDE DECREMENTS MIDWAY IN THE 10-MOVEMENT SEQUENCE.
AMPLITUDE_LIP_JAW: (0) NORMAL: NO TREMOR.
RIGIDITY_RLE: (0) NORMAL: NO RIGIDITY.
FINGER_TAPPING_RIGHT: (1) SLIGHT: ANY OF THE FOLLOWING: A) THE REGULAR RHYTHM IS BROKEN WITH ONE WITH ONE OR TWO INTERRUPTIONS OR HESITATIONS OF THE MOVEMENT  B) SLIGHT SLOWING  C) THE AMPLITUDE DECREMENTS NEAR THE END OF THE 10 MOVEMENTS.
CONSTANCY_TREMOR_ATREST: (0) NORMAL: NO TREMOR.
POSTURAL_STABILITY: (1) SLIGHT: 3-5 STEPS, BUT RECOVERS UNAIDED.
FACIAL_EXPRESSION: (1) SLIGHT: MINIMAL MASKED FACIES MANIFESTED ONLY BY DECREASED FREQUENCY OF BLINKING.
RIGIDITY_LUE: (0) NORMAL: NO RIGIDITY.
HANDMOVEMENTS_RIGHT: (1) SLIGHT: ANY OF THE FOLLOWING: A) THE REGULAR RHYTHM IS BROKEN WITH ONE WITH ONE OR TWO INTERRUPTIONS OR HESITATIONS OF THE MOVEMENT  B) SLIGHT SLOWING  C) THE AMPLITUDE DECREMENTS NEAR THE END OF THE 10 MOVEMENTS.
TOTAL_SCORE: 5
FINGER_TAPPING_LEFT: (2) MILD: ANY OF THE FOLLOWING: A) 3 TO 5 INTERRUPTIONS DURING TAPPING  B) MILD SLOWING  C) THE AMPLITUDE DECREMENTS MIDWAY IN THE 10-MOVEMENT SEQUENCE.
SPONTANEITY_OF_MOVEMENT: (1) SLIGHT: SLIGHT GLOBAL SLOWNESS AND POVERTY OF SPONTANEOUS MOVEMENTS.
PRONATION_SUPINATION_LEFT: (1) SLIGHT: ANY OF THE FOLLOWING: A) THE REGULAR RHYTHM IS BROKEN WITH ONE WITH ONE OR TWO INTERRUPTIONS OR HESITATIONS OF THE MOVEMENT  B) SLIGHT SLOWING  C) THE AMPLITUDE DECREMENTS NEAR THE END OF THE 10 MOVEMENTS.
DYSKINESIAS_PRESENT: NO
ARISING_CHAIR: (0) NORMAL: NO PROBLEMS. ABLE TO ARISE QUICKLY WITHOUT HESITATION.
HANDMOVEMENTS_LEFT: (1) SLIGHT: ANY OF THE FOLLOWING: A) THE REGULAR RHYTHM IS BROKEN WITH ONE WITH ONE OR TWO INTERRUPTIONS OR HESITATIONS OF THE MOVEMENT  B) SLIGHT SLOWING  C) THE AMPLITUDE DECREMENTS NEAR THE END OF THE 10 MOVEMENTS.
RIGIDITY_RUE: (0) NORMAL: NO RIGIDITY.
LEG_AGILITY_LEFT: (1) SLIGHT: ANY OF THE FOLLOWING: A) THE REGULAR RHYTHM IS BROKEN WITH ONE WITH ONE OR TWO INTERRUPTIONS OR HESITATIONS OF THE MOVEMENT  B) SLIGHT SLOWING  C) THE AMPLITUDE DECREMENTS NEAR THE END OF THE 10 MOVEMENTS.
TOTAL_SCORE: 17
TOETAPPING_RIGHT: (1) SLIGHT: ANY OF THE FOLLOWING: A) THE REGULAR RHYTHM IS BROKEN WITH ONE WITH ONE OR TWO INTERRUPTIONS OR HESITATIONS OF THE MOVEMENT  B) SLIGHT SLOWING  C) THE AMPLITUDE DECREMENTS NEAR THE END OF THE 10 MOVEMENTS.
SPEECH: (0) NORMAL: NO SPEECH PROBLEMS.
AMPLITUDE_LLE: (0) NORMAL: NO TREMOR.
PARKINSONS_MEDS: OFF
GAIT: (1) SLIGHT: INDEPENDENT WALKING WITH MINOR GAIT IMPAIRMENT.
AXIAL_SCORE: 5
FREEZING_GAIT: (0) NORMAL: NO FREEZING.
AMPLITUDE_RUE: (0) NORMAL: NO TREMOR.
RIGIDITY_LLE: (0) NORMAL: NO RIGIDITY.
POSTURE: (1) SLIGHT: NOT QUITE ERECT, BUT COULD BE NORMAL FOR OLDER PERSONS.
TOTAL_SCORE_LEFT: 7
PRONATION_SUPINATION_RIGHT: (1) SLIGHT: ANY OF THE FOLLOWING: A) THE REGULAR RHYTHM IS BROKEN WITH ONE WITH ONE OR TWO INTERRUPTIONS OR HESITATIONS OF THE MOVEMENT  B) SLIGHT SLOWING  C) THE AMPLITUDE DECREMENTS NEAR THE END OF THE 10 MOVEMENTS.
LEG_AGILITY_RIGHT: (1) SLIGHT: ANY OF THE FOLLOWING: A) THE REGULAR RHYTHM IS BROKEN WITH ONE WITH ONE OR TWO INTERRUPTIONS OR HESITATIONS OF THE MOVEMENT  B) SLIGHT SLOWING  C) THE AMPLITUDE DECREMENTS NEAR THE END OF THE 10 MOVEMENTS.

## 2023-10-02 NOTE — PROGRESS NOTES
Department of Neurology  Movement Disorders Division   Return Patient Visit    Patient: Carmen Carvajal   MRN: [id number]   : 1960   Date of Visit: 2023  PCP: Carolyn Duong   Referring provider: No ref. provider found    CC:  Return for suspected tardive syndrome    Interval history:  Ms. Carvajal is a 63 year old right-handed female with history significant for extensive psychiatric disease, with chronic exposure to dopamine blocking agents, seen last a few months ago was suspected to have an underlying tardive syndrome who presents to movement disorder clinic for regular return. Last visit was few months ago back in March of this year, with a plan to start on a low-dose of amantadine, and coordinate with her primary psychiatrist to potentially reduce Abilify. She is accompanied by family, who assists with collateral history.    Overall she is been about the same reportedly.  She still experiences some of the mouth movements, if she actually says that if anything they have gotten a little worse since last time she was here.  She still has, as the most disabling symptom, difficulties walking with a sensation that her legs are very heavy.  She denies any falls.    She does say that she is discussed the case with her psychiatrist, and the Abilify was reduced from 15 to 10 mg a day, that has not had any impact on her overall symptoms thus far.  She does not feel any worsening of her psychiatric disease either which is reassuring.  She does report that in discussion with a psychiatrist, he did not feel that it was a good idea to get her off the Abilify completely.    Otherwise she is tolerating the amantadine well, denies any side effects.      ROS:  All others negative except as listed above. Pertinent movement disorders-specific ROS listed above.    Past Medical History:   Diagnosis Date    Hypertension     Retinal detachment     right eye retina tear repair         Past Surgical History:    Procedure Laterality Date    RETINAL REATTACHMENT      laser retinopexy june and august 2014 right eye    thyroid  1990's    radiation          Current Outpatient Medications:     amantadine (SYMMETREL) 100 MG capsule, Take 1 capsule (100 mg) by mouth 3 times daily, Disp: 270 capsule, Rfl: 0    AMLODIPINE BESYLATE PO, Take 10 mg by mouth daily, Disp: , Rfl:     ARIPiprazole (ABILIFY) 10 MG tablet, Take 10 mg by mouth daily, Disp: , Rfl:     Cholecalciferol (VITAMIN D3 PO), Take 2,000 Units by mouth daily, Disp: , Rfl:     LEVOTHYROXINE SODIUM PO, Take 125 mcg by mouth daily, Disp: , Rfl:     losartan-hydrochlorothiazide (HYZAAR) 100-12.5 MG per tablet, Take 1 tablet by mouth daily, Disp: , Rfl:     methylcellulose (CITRUCEL) 500 MG TABS, Take 500 mg by mouth daily, Disp: , Rfl:     multivitamin, therapeutic (THERA-VIT) TABS, Take 1 tablet by mouth daily, Disp: , Rfl:     sertraline (ZOLOFT) 100 MG tablet, Take 100 mg by mouth daily, Disp: , Rfl:     vitamin B complex with vitamin C (STRESS TAB) tablet, Take 1 tablet by mouth daily, Disp: , Rfl:     vitamin C (ASCORBIC ACID) 500 MG tablet, Take 1,000 mg by mouth, Disp: , Rfl:     diclofenac (VOLTAREN) 1 % topical gel, , Disp: , Rfl:     risperiDONE (RISPERDAL) 2 MG tablet, , Disp: , Rfl:      Allergies   Allergen Reactions    Ace Inhibitors Cough     Verified in Meditech: Y, Severity in Meditech: S  HUT Comment: Verified in Meditech: Y, Severity in Meditech: S; HUT Reaction: Cough          Family History   Problem Relation Age of Onset    Retinal detachment Mother         Social History:  She  reports that she has never smoked. She has never used smokeless tobacco. She reports that she does not drink alcohol.     PHYSICAL EXAM:  /74   Pulse 73   SpO2 97%      NEURO:  UPDRS  Time:: 0900  Medication: Off  R Brain DBS:: None  L Brain DBS:: None  Dyskinesia (LID): No  Did LID interfere: No  Speech: (0) Normal: No speech problems.  Facial Expression: (1)  Slight: Minimal masked facies manifested only by decreased frequency of blinking.  Rigidity Neck: (0) Normal: No rigidity.  Rigidity RUE: (0) Normal: No rigidity.  Rigidity LUE: (0) Normal: No rigidity.  Rigidity RLE: (0) Normal: No rigidity.  Rigidity LLE: (0) Normal: No rigidity.  Finger Taps R: (1) Slight: Any of the following: a) the regular rhythm is broken with one with one or two interruptions or hesitations of the movement  b) slight slowing  c) the amplitude decrements near the end of the 10 movements.  Finger Taps L: (2) Mild: Any of the following: a) 3 to 5 interruptions during tapping  b) mild slowing  c) the amplitude decrements midway in the 10-movement sequence.  Hand Mvt R: (1) Slight: Any of the following: a) the regular rhythm is broken with one with one or two interruptions or hesitations of the movement  b) slight slowing  c) the amplitude decrements near the end of the 10 movements.  Hand Mvt L: (1) Slight: Any of the following: a) the regular rhythm is broken with one with one or two interruptions or hesitations of the movement  b) slight slowing  c) the amplitude decrements near the end of the 10 movements.  Pron-/Supinate R: (1) Slight: Any of the following: a) the regular rhythm is broken with one with one or two interruptions or hesitations of the movement  b) slight slowing  c) the amplitude decrements near the end of the 10 movements.  Pron-/Supinate L: (1) Slight: Any of the following: a) the regular rhythm is broken with one with one or two interruptions or hesitations of the movement  b) slight slowing  c) the amplitude decrements near the end of the 10 movements.  Toe Tap R: (1) Slight: Any of the following: a) the regular rhythm is broken with one with one or two interruptions or hesitations of the movement  b) slight slowing  c) the amplitude decrements near the end of the 10 movements.  Toe Tap L: (2) Mild: Any of the following: a) 3 to 5 interruptions during tapping  b) mild  slowing  c) the amplitude decrements midway in the 10-movement sequence.  Leg Agility R: (1) Slight: Any of the following: a) the regular rhythm is broken with one with one or two interruptions or hesitations of the movement  b) slight slowing  c) the amplitude decrements near the end of the 10 movements.  Leg Agility L: (1) Slight: Any of the following: a) the regular rhythm is broken with one with one or two interruptions or hesitations of the movement  b) slight slowing  c) the amplitude decrements near the end of the 10 movements.  Arise From Chair: (0) Normal: No problems. Able to arise quickly without hesitation.  Gait: (1) Slight: Independent walking with minor gait impairment.  Gait Freezing: (0) Normal: No freezing.  Postural Stability: (1) Slight: 3-5 steps, but recovers unaided.  Posture: (1) Slight: Not quite erect, but could be normal for older persons.  Global Spont Mvt: (1) Slight: Slight global slowness and poverty of spontaneous movements.  Postural Tremor RUE: (0) Normal: No tremor.  Postural Tremor LUE: (0) Normal: No tremor.  Kinetic Tremor RUE: (0) Normal: No tremor.  Kinetic Tremor LUE: (0) Normal: No tremor.  Rest Tremor RUE: (0) Normal: No tremor.  Rest Tremor LUE: (0) Normal: No tremor.  Rest Tremor RLE: (0) Normal: No tremor.  Rest Tremor LLE: (0) Normal: No tremor.  Rest Tremor Lip/Jaw: (0) Normal: No tremor.  Rest Tremor Constancy: (0) Normal: No tremor.  Total Right: 5  Total Left: 7  Axial Total: 5  Total: 17    Movement disorders observations: On examination today, there was no appreciable orofacial movements.  She remains slightly parkinsonian.  She still remains with the same frequency and intensity of distal involuntary movements from her ankles down, which have a little bit of a motor overflow when she is stimulated by giving all the motor tasks and other body parts.    DATA REVIEWED:  Reviewed pertinent available in epic.    ASSESSMENT:  63-year-old right-handed female with  extensive psychiatric history, history of dopamine blocking agent exposure.  No interval improvement per her report.  Objective assessment today does show some improvement in orofacial movements, which it is unclear at this point and whether this is secondary to the amantadine being on board versus the Abilify being slightly reduced since last time she was seen by us.    PLAN:  -Reviewed impression and plan with patient and     - We can try to continue to work her psychiatrist on Abilify reduction.  We will keep the amantadine the same for now.  She may benefit from a dopamine depleting agents such as Ingrezza or Austedo, however will need psychiatry clearance on this since he can potentially worsen depressive symptoms.    - Another possibility could be to just do a levodopa trial and see how she does.    - She just completed a physical therapy program for a different issue.  Perhaps now we could make another referral for her to be seen by PT to do some gait and balance training as a nonpharmacological approach to her sensation of heaviness in her legs and difficulties walking.  She is on board with that.  We will go ahead and place a referral today.    - She also inquired about staying on pregabalin, which was a medication started by one of our colleagues at the [facility] in [place] as an attempt to control potential myoclonic movements.  Again this is the second visit that I see her and no myoclonic movements were appreciated.  She says she has not noticed any difference in her movements by taking it twice a day so a few months ago she actually, herself, reduced pregabalin from 75 mg twice a day to just once a day, and again there is no change in the quality or frequency of symptoms.  I instructed her to, starting today, taking that morning dose of pregabalin every other day for a couple of weeks, and then eventually to stop it after that.  She voiced understanding she is going to try that.  She can  hold onto the pills in case she has to go back on.    - Otherwise we will plan on regrouping in the next 4 to 6 months.  Sooner if needed.  She was encouraged to contact us back to any questions, concerns, any other issues in the meantime.    There are no Patient Instructions on file for this visit.      Andrea Calderon MD (Leo) (he/him/his)   of Neurology  [facility]  Department of Neurology      Thank you for referring Carmen Carvajal to our [facility] Movement Disorders Program, we appreciate the opportunity of being your partner in healthcare. Please feel free to reach out to us at any point if any questions or concerns about this visit.    Attending attestation: Today I spent a total 30 minutes on this case, with greater than 50% of the time spent in face-to-face, examining, obtaining history, providing education and coordination of care. Additional time was spent in chart review, ancillary data, and documentation as delineated above.

## 2023-10-02 NOTE — LETTER
10/2/2023         RE: Carmen Carvajal  [address]  [place] [address]        Dear Colleague,    Thank you for referring your patient, Carmen Carvajal, to the [facility] NEUROLOGY [facility]. Please see a copy of my visit note below.    Department of Neurology  Movement Disorders Division   Return Patient Visit    Patient: Carmen Carvajal   MRN: [id number]   : 1960   Date of Visit: 2023  PCP: Carolyn Duong   Referring provider: No ref. provider found    CC:  Return for suspected tardive syndrome    Interval history:  Ms. Carvajal is a 63 year old right-handed female with history significant for extensive psychiatric disease, with chronic exposure to dopamine blocking agents, seen last a few months ago was suspected to have an underlying tardive syndrome who presents to movement disorder clinic for regular return. Last visit was few months ago back in March of this year, with a plan to start on a low-dose of amantadine, and coordinate with her primary psychiatrist to potentially reduce Abilify. She is accompanied by family, who assists with collateral history.    Overall she is been about the same reportedly.  She still experiences some of the mouth movements, if she actually says that if anything they have gotten a little worse since last time she was here.  She still has, as the most disabling symptom, difficulties walking with a sensation that her legs are very heavy.  She denies any falls.    She does say that she is discussed the case with her psychiatrist, and the Abilify was reduced from 15 to 10 mg a day, that has not had any impact on her overall symptoms thus far.  She does not feel any worsening of her psychiatric disease either which is reassuring.  She does report that in discussion with a psychiatrist, he did not feel that it was a good idea to get her off the Abilify completely.    Otherwise she is tolerating the amantadine well, denies any  side effects.      ROS:  All others negative except as listed above. Pertinent movement disorders-specific ROS listed above.    Past Medical History:   Diagnosis Date     Hypertension      Retinal detachment     right eye retina tear repair         Past Surgical History:   Procedure Laterality Date     RETINAL REATTACHMENT      laser retinopexy june and august 2014 right eye     thyroid  1990's    radiation          Current Outpatient Medications:      amantadine (SYMMETREL) 100 MG capsule, Take 1 capsule (100 mg) by mouth 3 times daily, Disp: 270 capsule, Rfl: 0     AMLODIPINE BESYLATE PO, Take 10 mg by mouth daily, Disp: , Rfl:      ARIPiprazole (ABILIFY) 10 MG tablet, Take 10 mg by mouth daily, Disp: , Rfl:      Cholecalciferol (VITAMIN D3 PO), Take 2,000 Units by mouth daily, Disp: , Rfl:      LEVOTHYROXINE SODIUM PO, Take 125 mcg by mouth daily, Disp: , Rfl:      losartan-hydrochlorothiazide (HYZAAR) 100-12.5 MG per tablet, Take 1 tablet by mouth daily, Disp: , Rfl:      methylcellulose (CITRUCEL) 500 MG TABS, Take 500 mg by mouth daily, Disp: , Rfl:      multivitamin, therapeutic (THERA-VIT) TABS, Take 1 tablet by mouth daily, Disp: , Rfl:      sertraline (ZOLOFT) 100 MG tablet, Take 100 mg by mouth daily, Disp: , Rfl:      vitamin B complex with vitamin C (STRESS TAB) tablet, Take 1 tablet by mouth daily, Disp: , Rfl:      vitamin C (ASCORBIC ACID) 500 MG tablet, Take 1,000 mg by mouth, Disp: , Rfl:      diclofenac (VOLTAREN) 1 % topical gel, , Disp: , Rfl:      risperiDONE (RISPERDAL) 2 MG tablet, , Disp: , Rfl:      Allergies   Allergen Reactions     Ace Inhibitors Cough     Verified in Meditech: Y, Severity in Meditech: S  HUT Comment: Verified in Meditech: Y, Severity in Meditech: S; HUT Reaction: Cough          Family History   Problem Relation Age of Onset     Retinal detachment Mother         Social History:  She  reports that she has never smoked. She has never used smokeless tobacco. She reports  that she does not drink alcohol.     PHYSICAL EXAM:  /74   Pulse 73   SpO2 97%      NEURO:  UPDRS  Time:: 0900  Medication: Off  R Brain DBS:: None  L Brain DBS:: None  Dyskinesia (LID): No  Did LID interfere: No  Speech: (0) Normal: No speech problems.  Facial Expression: (1) Slight: Minimal masked facies manifested only by decreased frequency of blinking.  Rigidity Neck: (0) Normal: No rigidity.  Rigidity RUE: (0) Normal: No rigidity.  Rigidity LUE: (0) Normal: No rigidity.  Rigidity RLE: (0) Normal: No rigidity.  Rigidity LLE: (0) Normal: No rigidity.  Finger Taps R: (1) Slight: Any of the following: a) the regular rhythm is broken with one with one or two interruptions or hesitations of the movement  b) slight slowing  c) the amplitude decrements near the end of the 10 movements.  Finger Taps L: (2) Mild: Any of the following: a) 3 to 5 interruptions during tapping  b) mild slowing  c) the amplitude decrements midway in the 10-movement sequence.  Hand Mvt R: (1) Slight: Any of the following: a) the regular rhythm is broken with one with one or two interruptions or hesitations of the movement  b) slight slowing  c) the amplitude decrements near the end of the 10 movements.  Hand Mvt L: (1) Slight: Any of the following: a) the regular rhythm is broken with one with one or two interruptions or hesitations of the movement  b) slight slowing  c) the amplitude decrements near the end of the 10 movements.  Pron-/Supinate R: (1) Slight: Any of the following: a) the regular rhythm is broken with one with one or two interruptions or hesitations of the movement  b) slight slowing  c) the amplitude decrements near the end of the 10 movements.  Pron-/Supinate L: (1) Slight: Any of the following: a) the regular rhythm is broken with one with one or two interruptions or hesitations of the movement  b) slight slowing  c) the amplitude decrements near the end of the 10 movements.  Toe Tap R: (1) Slight: Any of the  following: a) the regular rhythm is broken with one with one or two interruptions or hesitations of the movement  b) slight slowing  c) the amplitude decrements near the end of the 10 movements.  Toe Tap L: (2) Mild: Any of the following: a) 3 to 5 interruptions during tapping  b) mild slowing  c) the amplitude decrements midway in the 10-movement sequence.  Leg Agility R: (1) Slight: Any of the following: a) the regular rhythm is broken with one with one or two interruptions or hesitations of the movement  b) slight slowing  c) the amplitude decrements near the end of the 10 movements.  Leg Agility L: (1) Slight: Any of the following: a) the regular rhythm is broken with one with one or two interruptions or hesitations of the movement  b) slight slowing  c) the amplitude decrements near the end of the 10 movements.  Arise From Chair: (0) Normal: No problems. Able to arise quickly without hesitation.  Gait: (1) Slight: Independent walking with minor gait impairment.  Gait Freezing: (0) Normal: No freezing.  Postural Stability: (1) Slight: 3-5 steps, but recovers unaided.  Posture: (1) Slight: Not quite erect, but could be normal for older persons.  Global Spont Mvt: (1) Slight: Slight global slowness and poverty of spontaneous movements.  Postural Tremor RUE: (0) Normal: No tremor.  Postural Tremor LUE: (0) Normal: No tremor.  Kinetic Tremor RUE: (0) Normal: No tremor.  Kinetic Tremor LUE: (0) Normal: No tremor.  Rest Tremor RUE: (0) Normal: No tremor.  Rest Tremor LUE: (0) Normal: No tremor.  Rest Tremor RLE: (0) Normal: No tremor.  Rest Tremor LLE: (0) Normal: No tremor.  Rest Tremor Lip/Jaw: (0) Normal: No tremor.  Rest Tremor Constancy: (0) Normal: No tremor.  Total Right: 5  Total Left: 7  Axial Total: 5  Total: 17    Movement disorders observations: On examination today, there was no appreciable orofacial movements.  She remains slightly parkinsonian.  She still remains with the same frequency and intensity of  distal involuntary movements from her ankles down, which have a little bit of a motor overflow when she is stimulated by giving all the motor tasks and other body parts.    DATA REVIEWED:  Reviewed pertinent available in epic.    ASSESSMENT:  63-year-old right-handed female with extensive psychiatric history, history of dopamine blocking agent exposure.  No interval improvement per her report.  Objective assessment today does show some improvement in orofacial movements, which it is unclear at this point and whether this is secondary to the amantadine being on board versus the Abilify being slightly reduced since last time she was seen by us.    PLAN:  -Reviewed impression and plan with patient and     - We can try to continue to work her psychiatrist on Abilify reduction.  We will keep the amantadine the same for now.  She may benefit from a dopamine depleting agents such as Ingrezza or Austedo, however will need psychiatry clearance on this since he can potentially worsen depressive symptoms.    - Another possibility could be to just do a levodopa trial and see how she does.    - She just completed a physical therapy program for a different issue.  Perhaps now we could make another referral for her to be seen by PT to do some gait and balance training as a nonpharmacological approach to her sensation of heaviness in her legs and difficulties walking.  She is on board with that.  We will go ahead and place a referral today.    - She also inquired about staying on pregabalin, which was a medication started by one of our colleagues at the [facility] in [place] as an attempt to control potential myoclonic movements.  Again this is the second visit that I see her and no myoclonic movements were appreciated.  She says she has not noticed any difference in her movements by taking it twice a day so a few months ago she actually, herself, reduced pregabalin from 75 mg twice a day to just once a day, and again  there is no change in the quality or frequency of symptoms.  I instructed her to, starting today, taking that morning dose of pregabalin every other day for a couple of weeks, and then eventually to stop it after that.  She voiced understanding she is going to try that.  She can hold onto the pills in case she has to go back on.    - Otherwise we will plan on regrouping in the next 4 to 6 months.  Sooner if needed.  She was encouraged to contact us back to any questions, concerns, any other issues in the meantime.    There are no Patient Instructions on file for this visit.      Andrea Calderon MD (Leo) (he/him/his)   of Neurology  [facility]  Department of Neurology      Thank you for referring Carmen Carvajal to our [facility] Movement Disorders Program, we appreciate the opportunity of being your partner in healthcare. Please feel free to reach out to us at any point if any questions or concerns about this visit.    Attending attestation: Today I spent a total 30 minutes on this case, with greater than 50% of the time spent in face-to-face, examining, obtaining history, providing education and coordination of care. Additional time was spent in chart review, ancillary data, and documentation as delineated above.      Again, thank you for allowing me to participate in the care of your patient.        Sincerely,        Andrea Mason MD

## 2023-10-09 ENCOUNTER — TELEPHONE (OUTPATIENT)
Dept: NEUROLOGY | Facility: CLINIC | Age: 63
End: 2023-10-09
Payer: COMMERCIAL

## 2023-10-09 NOTE — TELEPHONE ENCOUNTER
Per last OV note:  We can try to continue to work her psychiatrist on Abilify reduction.  We will keep the amantadine the same for now.  She may benefit from a dopamine depleting agents such as Ingrezza or Austedo, however will need psychiatry clearance on this since he can potentially worsen depressive symptoms.     - Another possibility could be to just do a levodopa trial and see how she does.    Will route to provider to review.     Marie CARRILLO RN, BSN  [facility] Neurology

## 2023-10-09 NOTE — TELEPHONE ENCOUNTER
Patient is waiting to hear if her medication from Dr. Calderon has been approved by the rest of her medical team.

## 2023-10-11 ENCOUNTER — TELEPHONE (OUTPATIENT)
Dept: NEUROLOGY | Facility: CLINIC | Age: 63
End: 2023-10-11
Payer: COMMERCIAL

## 2023-10-11 NOTE — TELEPHONE ENCOUNTER
Confirmed psychiatrist name with patient. Wanted to be sure because patient was not certain at appointment. Advised that we had reached out to them regarding medications and will follow up if any changes have been made.   Patient understands.        Era Pedroza MA

## 2023-10-11 NOTE — TELEPHONE ENCOUNTER
Before provider starts meds for patient, would like to consult with psychiatry first.  Sent office notes along with patient visit.       Era Pedroza MA

## 2023-10-11 NOTE — TELEPHONE ENCOUNTER
Faxed Form October 11, 2023 to fax number [phone]    Right Fax confirmed at 10:50 AM    Era Pedroza MA

## 2023-10-16 NOTE — TELEPHONE ENCOUNTER
Diana esposito [place] from  office (Patient's Psychiatry) have not received the forms from our clinic.   They are requesting clinic to refax it to fax# [phone]    Diana CB# [phone]

## 2023-10-16 NOTE — TELEPHONE ENCOUNTER
Re-Faxed Ov notes and questions for   Requested for:  Dr. Carreon to review and advise on medications.       Era Pedroza MA

## 2023-10-16 NOTE — TELEPHONE ENCOUNTER
Dr. Calderon wanted to get in touch with psychiatry team to receive clearance from them prior to prescribing anything else besides amantadine.      Care team faxed OV notes and request to them  on 10/11.  Clinic has not received response.     Writer attempted calling their office, LVM for nursing to call back.      Called and updated patient.     Marie CARRILLO RN, BSN  [facility] Neurology

## 2023-10-16 NOTE — TELEPHONE ENCOUNTER
Faxed Form October 16, 2023 to fax number [phone] & [phone]    Right Fax confirmed at 3:15 PM    Era Pedroza MA

## 2023-10-31 ENCOUNTER — TELEPHONE (OUTPATIENT)
Dept: NEUROLOGY | Facility: CLINIC | Age: 63
End: 2023-10-31
Payer: COMMERCIAL

## 2023-10-31 DIAGNOSIS — G25.9 MOVEMENT DISORDER: ICD-10-CM

## 2023-10-31 RX ORDER — AMANTADINE HYDROCHLORIDE 100 MG/1
100 CAPSULE, GELATIN COATED ORAL 3 TIMES DAILY
Qty: 270 CAPSULE | Refills: 0 | Status: SHIPPED | OUTPATIENT
Start: 2023-10-31 | End: 2024-06-11

## 2023-10-31 NOTE — TELEPHONE ENCOUNTER
Pt last appt 10/2/23.  No change in medications at that time.     Amantadine increased to 100 mg TID 4/13/23.    Next follow up 1/8/24.     Will send in refill as requested.     Called and let pt know.     Marie CARRILLO RN, BSN  [facility] Neurology

## 2023-10-31 NOTE — TELEPHONE ENCOUNTER
[facility] Call Center    Phone Message    May a detailed message be left on voicemail: yes     Reason for Call: Medication Refill Request    Has the patient contacted the pharmacy for the refill? Yes   Name of medication being requested: amantadine (SYMMETREL) 100 MG capsule  Provider who prescribed the medication: Andrea Howell  Pharmacy: [id number] [address] AT [facility] [address] [facility] [address]  Date medication is needed: ASAP     Pt is requesting a refill for the medication listed above. Pt has a couple days left.     Action Taken: Message routed to:  Other: [facility]    Travel Screening: Not Applicable

## 2023-11-06 ENCOUNTER — TELEPHONE (OUTPATIENT)
Dept: NEUROLOGY | Facility: CLINIC | Age: 63
End: 2023-11-06
Payer: COMMERCIAL

## 2023-11-06 NOTE — TELEPHONE ENCOUNTER
[facility] Call Center    Phone Message    May a detailed message be left on voicemail: no     Reason for Call: Other: Medication Abilify    Provider just wanted to provide an update on the medication listed. Patient currently cutting down on medication.       Any question please reach out to Dr. Garvey at  # [phone]      Action Taken: Other: cs neur    Travel Screening: Not Applicable

## 2023-12-10 ENCOUNTER — HEALTH MAINTENANCE LETTER (OUTPATIENT)
Age: 63
End: 2023-12-10

## 2024-01-26 ENCOUNTER — TELEPHONE (OUTPATIENT)
Dept: NEUROLOGY | Facility: CLINIC | Age: 64
End: 2024-01-26
Payer: COMMERCIAL

## 2024-01-29 ENCOUNTER — OFFICE VISIT (OUTPATIENT)
Dept: NEUROLOGY | Facility: CLINIC | Age: 64
End: 2024-01-29
Payer: COMMERCIAL

## 2024-01-29 VITALS
OXYGEN SATURATION: 95 % | SYSTOLIC BLOOD PRESSURE: 102 MMHG | WEIGHT: 198 LBS | HEART RATE: 91 BPM | DIASTOLIC BLOOD PRESSURE: 70 MMHG | BODY MASS INDEX: 32.45 KG/M2

## 2024-01-29 DIAGNOSIS — T43.505A NEUROLEPTIC-INDUCED PARKINSONISM (H): Primary | ICD-10-CM

## 2024-01-29 DIAGNOSIS — G21.11 NEUROLEPTIC-INDUCED PARKINSONISM (H): Primary | ICD-10-CM

## 2024-01-29 PROCEDURE — G2211 COMPLEX E/M VISIT ADD ON: HCPCS | Performed by: PSYCHIATRY & NEUROLOGY

## 2024-01-29 PROCEDURE — 99214 OFFICE O/P EST MOD 30 MIN: CPT | Performed by: PSYCHIATRY & NEUROLOGY

## 2024-01-29 ASSESSMENT — UNIFIED PARKINSONS DISEASE RATING SCALE (UPDRS)
RIGIDITY_LLE: (0) NORMAL: NO RIGIDITY.
TOTAL_SCORE: 5
DYSKINESIAS_PRESENT: NO
SPEECH: (0) NORMAL: NO SPEECH PROBLEMS.
TOETAPPING_RIGHT: (1) SLIGHT: ANY OF THE FOLLOWING: A) THE REGULAR RHYTHM IS BROKEN WITH ONE WITH ONE OR TWO INTERRUPTIONS OR HESITATIONS OF THE MOVEMENT  B) SLIGHT SLOWING  C) THE AMPLITUDE DECREMENTS NEAR THE END OF THE 10 MOVEMENTS.
AXIAL_SCORE: 5
POSTURAL_STABILITY: (1) SLIGHT: 3-5 STEPS, BUT RECOVERS UNAIDED.
TOTAL_SCORE: 17
TOETAPPING_LEFT: (2) MILD: ANY OF THE FOLLOWING: A) 3 TO 5 INTERRUPTIONS DURING TAPPING  B) MILD SLOWING  C) THE AMPLITUDE DECREMENTS MIDWAY IN THE 10-MOVEMENT SEQUENCE.
AMPLITUDE_LLE: (0) NORMAL: NO TREMOR.
FINGER_TAPPING_LEFT: (2) MILD: ANY OF THE FOLLOWING: A) 3 TO 5 INTERRUPTIONS DURING TAPPING  B) MILD SLOWING  C) THE AMPLITUDE DECREMENTS MIDWAY IN THE 10-MOVEMENT SEQUENCE.
FREEZING_GAIT: (0) NORMAL: NO FREEZING.
AMPLITUDE_RUE: (0) NORMAL: NO TREMOR.
FINGER_TAPPING_RIGHT: (1) SLIGHT: ANY OF THE FOLLOWING: A) THE REGULAR RHYTHM IS BROKEN WITH ONE WITH ONE OR TWO INTERRUPTIONS OR HESITATIONS OF THE MOVEMENT  B) SLIGHT SLOWING  C) THE AMPLITUDE DECREMENTS NEAR THE END OF THE 10 MOVEMENTS.
TOTAL_SCORE_LEFT: 7
HANDMOVEMENTS_RIGHT: (1) SLIGHT: ANY OF THE FOLLOWING: A) THE REGULAR RHYTHM IS BROKEN WITH ONE WITH ONE OR TWO INTERRUPTIONS OR HESITATIONS OF THE MOVEMENT  B) SLIGHT SLOWING  C) THE AMPLITUDE DECREMENTS NEAR THE END OF THE 10 MOVEMENTS.
PRONATION_SUPINATION_RIGHT: (1) SLIGHT: ANY OF THE FOLLOWING: A) THE REGULAR RHYTHM IS BROKEN WITH ONE WITH ONE OR TWO INTERRUPTIONS OR HESITATIONS OF THE MOVEMENT  B) SLIGHT SLOWING  C) THE AMPLITUDE DECREMENTS NEAR THE END OF THE 10 MOVEMENTS.
GAIT: (1) SLIGHT: INDEPENDENT WALKING WITH MINOR GAIT IMPAIRMENT.
LEG_AGILITY_RIGHT: (1) SLIGHT: ANY OF THE FOLLOWING: A) THE REGULAR RHYTHM IS BROKEN WITH ONE WITH ONE OR TWO INTERRUPTIONS OR HESITATIONS OF THE MOVEMENT  B) SLIGHT SLOWING  C) THE AMPLITUDE DECREMENTS NEAR THE END OF THE 10 MOVEMENTS.
FACIAL_EXPRESSION: (1) SLIGHT: MINIMAL MASKED FACIES MANIFESTED ONLY BY DECREASED FREQUENCY OF BLINKING.
PARKINSONS_MEDS: OFF
SPONTANEITY_OF_MOVEMENT: (1) SLIGHT: SLIGHT GLOBAL SLOWNESS AND POVERTY OF SPONTANEOUS MOVEMENTS.
MOVEMENTS_INTERFERE_WITH_RATINGS: NO
RIGIDITY_RLE: (0) NORMAL: NO RIGIDITY.
ARISING_CHAIR: (0) NORMAL: NO PROBLEMS. ABLE TO ARISE QUICKLY WITHOUT HESITATION.
HANDMOVEMENTS_LEFT: (1) SLIGHT: ANY OF THE FOLLOWING: A) THE REGULAR RHYTHM IS BROKEN WITH ONE WITH ONE OR TWO INTERRUPTIONS OR HESITATIONS OF THE MOVEMENT  B) SLIGHT SLOWING  C) THE AMPLITUDE DECREMENTS NEAR THE END OF THE 10 MOVEMENTS.
CONSTANCY_TREMOR_ATREST: (0) NORMAL: NO TREMOR.
AMPLITUDE_RLE: (0) NORMAL: NO TREMOR.
LEG_AGILITY_LEFT: (1) SLIGHT: ANY OF THE FOLLOWING: A) THE REGULAR RHYTHM IS BROKEN WITH ONE WITH ONE OR TWO INTERRUPTIONS OR HESITATIONS OF THE MOVEMENT  B) SLIGHT SLOWING  C) THE AMPLITUDE DECREMENTS NEAR THE END OF THE 10 MOVEMENTS.
RIGIDITY_NECK: (0) NORMAL: NO RIGIDITY.
RIGIDITY_RUE: (0) NORMAL: NO RIGIDITY.
RIGIDITY_LUE: (0) NORMAL: NO RIGIDITY.
AMPLITUDE_LUE: (0) NORMAL: NO TREMOR.
POSTURE: (1) SLIGHT: NOT QUITE ERECT, BUT COULD BE NORMAL FOR OLDER PERSONS.
AMPLITUDE_LIP_JAW: (0) NORMAL: NO TREMOR.
PRONATION_SUPINATION_LEFT: (1) SLIGHT: ANY OF THE FOLLOWING: A) THE REGULAR RHYTHM IS BROKEN WITH ONE WITH ONE OR TWO INTERRUPTIONS OR HESITATIONS OF THE MOVEMENT  B) SLIGHT SLOWING  C) THE AMPLITUDE DECREMENTS NEAR THE END OF THE 10 MOVEMENTS.

## 2024-01-29 NOTE — LETTER
2024         RE: Carmen Carvajal  [address]  [place] [address]        Dear Colleague,    Thank you for referring your patient, Carmen Carvajal, to the [facility] NEUROLOG[facility]INICS [facility]. Please see a copy of my visit note below.    Department of Neurology  Movement Disorders Division   Return Patient Visit    Patient: Carmen Carvajal   MRN: [id number]   : 1960   Date of Visit: 2024  PCP: Carolyn Duong   Referring provider: Jose Mason    CC:  Concerns for drug-induced parkinsonism/tardive syndrome, return    Interval history:  Ms. Carvajal is a 64 year old R-handed female with history significant for psychiatric disease, s/p prolonged exposure to Abilify who presents to movement disorder clinic for followup. Last visit was a couple of months ago, with a plan to have her psychiatrist revisit the Abilify.     Overall she doesn't feel much of a change. Abilify has been reduced to 5mg/day and she feels no difference thus far. She says that per discussion with her psychiatrist, it was deemed to be too  dangerous to discontinued Abilify, however she is going to reassess next week. So far no recurrence of any psychiatric problems.     She denies any new changes or symptoms to discuss today.     ROS:  All others negative except as listed above. Pertinent movement disorders-specific ROS listed above.    Past Medical History:   Diagnosis Date     Hypertension      Retinal detachment     right eye retina tear repair         Past Surgical History:   Procedure Laterality Date     RETINAL REATTACHMENT      laser retinopexy  and 2014 right eye     thyroid      radiation          Current Outpatient Medications:      AMLODIPINE BESYLATE PO, Take 10 mg by mouth daily, Disp: , Rfl:      ARIPiprazole (ABILIFY) 10 MG tablet, Take 10 mg by mouth daily, Disp: , Rfl:      Cholecalciferol (VITAMIN D3 PO), Take 2,000 Units by mouth daily, Disp: , Rfl:       LEVOTHYROXINE SODIUM PO, Take 125 mcg by mouth daily, Disp: , Rfl:      losartan-hydrochlorothiazide (HYZAAR) 100-12.5 MG per tablet, Take 1 tablet by mouth daily, Disp: , Rfl:      methylcellulose (CITRUCEL) 500 MG TABS, Take 500 mg by mouth daily, Disp: , Rfl:      sertraline (ZOLOFT) 100 MG tablet, Take 100 mg by mouth daily, Disp: , Rfl:      vitamin B complex with vitamin C (STRESS TAB) tablet, Take 1 tablet by mouth daily, Disp: , Rfl:      vitamin C (ASCORBIC ACID) 500 MG tablet, Take 1,000 mg by mouth, Disp: , Rfl:      amantadine (SYMMETREL) 100 MG capsule, Take 1 capsule (100 mg) by mouth 3 times daily, Disp: 270 capsule, Rfl: 0     multivitamin, therapeutic (THERA-VIT) TABS, Take 1 tablet by mouth daily (Patient not taking: Reported on 1/29/2024), Disp: , Rfl:      risperiDONE (RISPERDAL) 2 MG tablet, , Disp: , Rfl:      Allergies   Allergen Reactions     Ace Inhibitors Cough     Verified in Meditech: Y, Severity in Meditech: S  HUT Comment: Verified in Meditech: Y, Severity in Meditech: S; HUT Reaction: Cough          Family History   Problem Relation Age of Onset     Retinal detachment Mother         Social History:  She  reports that she has never smoked. She has never used smokeless tobacco. She reports that she does not drink alcohol.     PHYSICAL EXAM:  /70   Pulse 91   Wt 89.8 kg (198 lb)   SpO2 95%   BMI 32.45 kg/m       NEURO:  UPDRS  Time:: 1430  Medication: Off  R Brain DBS:: None  L Brain DBS:: None  Dyskinesia (LID): No  Did LID interfere: No  Speech: (0) Normal: No speech problems.  Facial Expression: (1) Slight: Minimal masked facies manifested only by decreased frequency of blinking.  Rigidity Neck: (0) Normal: No rigidity.  Rigidity RUE: (0) Normal: No rigidity.  Rigidity LUE: (0) Normal: No rigidity.  Rigidity RLE: (0) Normal: No rigidity.  Rigidity LLE: (0) Normal: No rigidity.  Finger Taps R: (1) Slight: Any of the following: a) the regular rhythm is broken with one with one  or two interruptions or hesitations of the movement  b) slight slowing  c) the amplitude decrements near the end of the 10 movements.  Finger Taps L: (2) Mild: Any of the following: a) 3 to 5 interruptions during tapping  b) mild slowing  c) the amplitude decrements midway in the 10-movement sequence.  Hand Mvt R: (1) Slight: Any of the following: a) the regular rhythm is broken with one with one or two interruptions or hesitations of the movement  b) slight slowing  c) the amplitude decrements near the end of the 10 movements.  Hand Mvt L: (1) Slight: Any of the following: a) the regular rhythm is broken with one with one or two interruptions or hesitations of the movement  b) slight slowing  c) the amplitude decrements near the end of the 10 movements.  Pron-/Supinate R: (1) Slight: Any of the following: a) the regular rhythm is broken with one with one or two interruptions or hesitations of the movement  b) slight slowing  c) the amplitude decrements near the end of the 10 movements.  Pron-/Supinate L: (1) Slight: Any of the following: a) the regular rhythm is broken with one with one or two interruptions or hesitations of the movement  b) slight slowing  c) the amplitude decrements near the end of the 10 movements.  Toe Tap R: (1) Slight: Any of the following: a) the regular rhythm is broken with one with one or two interruptions or hesitations of the movement  b) slight slowing  c) the amplitude decrements near the end of the 10 movements.  Toe Tap L: (2) Mild: Any of the following: a) 3 to 5 interruptions during tapping  b) mild slowing  c) the amplitude decrements midway in the 10-movement sequence.  Leg Agility R: (1) Slight: Any of the following: a) the regular rhythm is broken with one with one or two interruptions or hesitations of the movement  b) slight slowing  c) the amplitude decrements near the end of the 10 movements.  Leg Agility L: (1) Slight: Any of the following: a) the regular rhythm is broken  with one with one or two interruptions or hesitations of the movement  b) slight slowing  c) the amplitude decrements near the end of the 10 movements.  Arise From Chair: (0) Normal: No problems. Able to arise quickly without hesitation.  Gait: (1) Slight: Independent walking with minor gait impairment.  Gait Freezing: (0) Normal: No freezing.  Postural Stability: (1) Slight: 3-5 steps, but recovers unaided.  Posture: (1) Slight: Not quite erect, but could be normal for older persons.  Global Spont Mvt: (1) Slight: Slight global slowness and poverty of spontaneous movements.  Postural Tremor RUE: (0) Normal: No tremor.  Postural Tremor LUE: (0) Normal: No tremor.  Kinetic Tremor RUE: (0) Normal: No tremor.  Kinetic Tremor LUE: (0) Normal: No tremor.  Rest Tremor RUE: (0) Normal: No tremor.  Rest Tremor LUE: (0) Normal: No tremor.  Rest Tremor RLE: (0) Normal: No tremor.  Rest Tremor LLE: (0) Normal: No tremor.  Rest Tremor Lip/Jaw: (0) Normal: No tremor.  Rest Tremor Constancy: (0) Normal: No tremor.  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 She is s/p reduction of Abilify dosage to 5mg without any clear change in symptoms.     PLAN:  - Reviewed impression and plan  - Continue supportive care. She is undergoing PT on a regular basis  - We went over the expectations that it can take a good 12-18 months before her brain re-equilibrates from removing exposure to antipsychotics.   - No benefit from Amantadine, we will stop that today  - We also talked about a trial of sinemet, but we will get clearance from her psychiatry first  - RTC 4-6 months, sooner if needed, encouraged to call if questions, concerns, or changes.     There are no Patient Instructions on file for this visit.      Andrea Calderon MD (Leo) (he/him/his)  Associate  Professor of Neurology  [facility]  Department of Neurology      Thank you for referring Carmen Carvajal to our [facility] Movement Disorders Program, we appreciate the opportunity of being your partner in healthcare. Please feel free to reach out to us at any point if any questions or concerns about this visit.    Attending attestation: Today I spent a total 30 minutes on this case, with greater than 50% of the time spent in face-to-face, examining, obtaining history, providing education and coordination of care. Additional time was spent in chart review, ancillary data, and documentation as delineated above.    The longitudinal plan of care for Carmen was addressed during this visit. Due to the added complexity in care, I will continue to support Carmen Carvajal in the subsequent management of this condition(s) and with the ongoing continuity of care of this condition(s).      Again, thank you for allowing me to participate in the care of your patient.        Sincerely,        Andrea Mason MD

## 2024-01-29 NOTE — PROGRESS NOTES
Department of Neurology  Movement Disorders Division   Return Patient Visit    Patient: Carmen Carvajal   MRN: [id number]   : 1960   Date of Visit: 2024  PCP: Carolyn Duong   Referring provider: Jose Mason    CC:  Concerns for drug-induced parkinsonism/tardive syndrome, return    Interval history:  Ms. Carvajal is a 64 year old R-handed female with history significant for psychiatric disease, s/p prolonged exposure to Abilify who presents to movement disorder clinic for followup. Last visit was a couple of months ago, with a plan to have her psychiatrist revisit the Abilify.     Overall she doesn't feel much of a change. Abilify has been reduced to 5mg/day and she feels no difference thus far. She says that per discussion with her psychiatrist, it was deemed to be too  dangerous to discontinued Abilify, however she is going to reassess next week. So far no recurrence of any psychiatric problems.     She denies any new changes or symptoms to discuss today.     ROS:  All others negative except as listed above. Pertinent movement disorders-specific ROS listed above.    Past Medical History:   Diagnosis Date    Hypertension     Retinal detachment     right eye retina tear repair         Past Surgical History:   Procedure Laterality Date    RETINAL REATTACHMENT      laser retinopexy  and 2014 right eye    thyroid      radiation          Current Outpatient Medications:     AMLODIPINE BESYLATE PO, Take 10 mg by mouth daily, Disp: , Rfl:     ARIPiprazole (ABILIFY) 10 MG tablet, Take 10 mg by mouth daily, Disp: , Rfl:     Cholecalciferol (VITAMIN D3 PO), Take 2,000 Units by mouth daily, Disp: , Rfl:     LEVOTHYROXINE SODIUM PO, Take 125 mcg by mouth daily, Disp: , Rfl:     losartan-hydrochlorothiazide (HYZAAR) 100-12.5 MG per tablet, Take 1 tablet by mouth daily, Disp: , Rfl:     methylcellulose (CITRUCEL) 500 MG TABS, Take 500 mg by mouth daily, Disp: , Rfl:     sertraline  (ZOLOFT) 100 MG tablet, Take 100 mg by mouth daily, Disp: , Rfl:     vitamin B complex with vitamin C (STRESS TAB) tablet, Take 1 tablet by mouth daily, Disp: , Rfl:     vitamin C (ASCORBIC ACID) 500 MG tablet, Take 1,000 mg by mouth, Disp: , Rfl:     amantadine (SYMMETREL) 100 MG capsule, Take 1 capsule (100 mg) by mouth 3 times daily, Disp: 270 capsule, Rfl: 0    multivitamin, therapeutic (THERA-VIT) TABS, Take 1 tablet by mouth daily (Patient not taking: Reported on 1/29/2024), Disp: , Rfl:     risperiDONE (RISPERDAL) 2 MG tablet, , Disp: , Rfl:      Allergies   Allergen Reactions    Ace Inhibitors Cough     Verified in Meditech: Y, Severity in Meditech: S  HUT Comment: Verified in Meditech: Y, Severity in Meditech: S; HUT Reaction: Cough          Family History   Problem Relation Age of Onset    Retinal detachment Mother         Social History:  She  reports that she has never smoked. She has never used smokeless tobacco. She reports that she does not drink alcohol.     PHYSICAL EXAM:  /70   Pulse 91   Wt 89.8 kg (198 lb)   SpO2 95%   BMI 32.45 kg/m       NEURO:  UPDRS  Time:: 1430  Medication: Off  R Brain DBS:: None  L Brain DBS:: None  Dyskinesia (LID): No  Did LID interfere: No  Speech: (0) Normal: No speech problems.  Facial Expression: (1) Slight: Minimal masked facies manifested only by decreased frequency of blinking.  Rigidity Neck: (0) Normal: No rigidity.  Rigidity RUE: (0) Normal: No rigidity.  Rigidity LUE: (0) Normal: No rigidity.  Rigidity RLE: (0) Normal: No rigidity.  Rigidity LLE: (0) Normal: No rigidity.  Finger Taps R: (1) Slight: Any of the following: a) the regular rhythm is broken with one with one or two interruptions or hesitations of the movement  b) slight slowing  c) the amplitude decrements near the end of the 10 movements.  Finger Taps L: (2) Mild: Any of the following: a) 3 to 5 interruptions during tapping  b) mild slowing  c) the amplitude decrements midway in the  10-movement sequence.  Hand Mvt R: (1) Slight: Any of the following: a) the regular rhythm is broken with one with one or two interruptions or hesitations of the movement  b) slight slowing  c) the amplitude decrements near the end of the 10 movements.  Hand Mvt L: (1) Slight: Any of the following: a) the regular rhythm is broken with one with one or two interruptions or hesitations of the movement  b) slight slowing  c) the amplitude decrements near the end of the 10 movements.  Pron-/Supinate R: (1) Slight: Any of the following: a) the regular rhythm is broken with one with one or two interruptions or hesitations of the movement  b) slight slowing  c) the amplitude decrements near the end of the 10 movements.  Pron-/Supinate L: (1) Slight: Any of the following: a) the regular rhythm is broken with one with one or two interruptions or hesitations of the movement  b) slight slowing  c) the amplitude decrements near the end of the 10 movements.  Toe Tap R: (1) Slight: Any of the following: a) the regular rhythm is broken with one with one or two interruptions or hesitations of the movement  b) slight slowing  c) the amplitude decrements near the end of the 10 movements.  Toe Tap L: (2) Mild: Any of the following: a) 3 to 5 interruptions during tapping  b) mild slowing  c) the amplitude decrements midway in the 10-movement sequence.  Leg Agility R: (1) Slight: Any of the following: a) the regular rhythm is broken with one with one or two interruptions or hesitations of the movement  b) slight slowing  c) the amplitude decrements near the end of the 10 movements.  Leg Agility L: (1) Slight: Any of the following: a) the regular rhythm is broken with one with one or two interruptions or hesitations of the movement  b) slight slowing  c) the amplitude decrements near the end of the 10 movements.  Arise From Chair: (0) Normal: No problems. Able to arise quickly without hesitation.  Gait: (1) Slight: Independent walking  with minor gait impairment.  Gait Freezing: (0) Normal: No freezing.  Postural Stability: (1) Slight: 3-5 steps, but recovers unaided.  Posture: (1) Slight: Not quite erect, but could be normal for older persons.  Global Spont Mvt: (1) Slight: Slight global slowness and poverty of spontaneous movements.  Postural Tremor RUE: (0) Normal: No tremor.  Postural Tremor LUE: (0) Normal: No tremor.  Kinetic Tremor RUE: (0) Normal: No tremor.  Kinetic Tremor LUE: (0) Normal: No tremor.  Rest Tremor RUE: (0) Normal: No tremor.  Rest Tremor LUE: (0) Normal: No tremor.  Rest Tremor RLE: (0) Normal: No tremor.  Rest Tremor LLE: (0) Normal: No tremor.  Rest Tremor Lip/Jaw: (0) Normal: No tremor.  Rest Tremor Constancy: (0) Normal: No tremor.  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 She is s/p reduction of Abilify dosage to 5mg without any clear change in symptoms.     PLAN:  - Reviewed impression and plan  - Continue supportive care. She is undergoing PT on a regular basis  - We went over the expectations that it can take a good 12-18 months before her brain re-equilibrates from removing exposure to antipsychotics.   - No benefit from Amantadine, we will stop that today  - We also talked about a trial of sinemet, but we will get clearance from her psychiatry first  - RTC 4-6 months, sooner if needed, encouraged to call if questions, concerns, or changes.     There are no Patient Instructions on file for this visit.      Andrea Calderon MD (Leo) (he/him/his)   of Neurology  [facility]  Department of Neurology      Thank you for referring Carmen Carvajal to our [facility] Movement Disorders Program, we appreciate the opportunity of being your partner in healthcare. Please feel free to reach out to us at any point if  any questions or concerns about this visit.    Attending attestation: Today I spent a total 30 minutes on this case, with greater than 50% of the time spent in face-to-face, examining, obtaining history, providing education and coordination of care. Additional time was spent in chart review, ancillary data, and documentation as delineated above.    The longitudinal plan of care for Carmen was addressed during this visit. Due to the added complexity in care, I will continue to support Carmenbenitez Carvajal in the subsequent management of this condition(s) and with the ongoing continuity of care of this condition(s).

## 2024-01-30 ENCOUNTER — TELEPHONE (OUTPATIENT)
Dept: NEUROLOGY | Facility: CLINIC | Age: 64
End: 2024-01-30
Payer: COMMERCIAL

## 2024-01-30 NOTE — TELEPHONE ENCOUNTER
Faxed Form January 30, 2024 to fax number [phone]    Right Fax confirmed at 8:36 AM    Era Pedroza MA

## 2024-02-06 ENCOUNTER — TELEPHONE (OUTPATIENT)
Dept: NEUROLOGY | Facility: CLINIC | Age: 64
End: 2024-02-06
Payer: COMMERCIAL

## 2024-02-06 NOTE — TELEPHONE ENCOUNTER
Faxed Form February 6, 2024 to fax number [phone]    Right Fax confirmed at 11:20 AM    Era Pedroza MA

## 2024-02-08 NOTE — TELEPHONE ENCOUNTER
[facility]-Incoming Fax    Response:   It would be reasonable to try Sinemet to help with her movement symptoms.    Sincerely: Hieu Garvey MD.     Response scanned into patient chart.    Era Pedroza MA

## 2024-02-12 ENCOUNTER — TELEPHONE (OUTPATIENT)
Dept: NEUROLOGY | Facility: CLINIC | Age: 64
End: 2024-02-12
Payer: COMMERCIAL

## 2024-02-12 DIAGNOSIS — G21.11 NEUROLEPTIC-INDUCED PARKINSONISM (H): Primary | ICD-10-CM

## 2024-02-12 DIAGNOSIS — T43.505A NEUROLEPTIC-INDUCED PARKINSONISM (H): Primary | ICD-10-CM

## 2024-02-12 DIAGNOSIS — R26.9 GAIT DISORDER: ICD-10-CM

## 2024-02-12 RX ORDER — CARBIDOPA AND LEVODOPA 25; 100 MG/1; MG/1
1.5 TABLET ORAL 3 TIMES DAILY
Qty: 405 TABLET | Refills: 3 | Status: SHIPPED | OUTPATIENT
Start: 2024-02-12 | End: 2025-02-11

## 2024-02-12 NOTE — TELEPHONE ENCOUNTER
Patient called regarding:    Pt asking for a call back to talk about a new medication discussed at the last visit. Please call Carmen at [phone] to discuss further.     LOV: 01/29/24    Provider Notes:  - Reviewed impression and plan  - Continue supportive care. She is undergoing PT on a regular basis  - We went over the expectations that it can take a good 12-18 months before her brain re-equilibrates from removing exposure to antipsychotics.   - No benefit from Amantadine, we will stop that today  - We also talked about a trial of sinemet, but we will get clearance from her psychiatry first  - RTC 4-6 months, sooner if needed, encouraged to call if questions, concerns, or changes.        Attempted to contact patient in regards to follow up on message. COCO for callback.    Renée Wu MA  [facility]

## 2024-02-12 NOTE — TELEPHONE ENCOUNTER
[facility] Call Center    Phone Message    May a detailed message be left on voicemail: yes     Reason for Call: Pt asking for a call back to talk about a new medication discussed at the last visit. Please call Carmen at [phone] to discuss further.    Action Taken: Message routed to:  Other: [facility]    Travel Screening: Not Applicable

## 2024-02-12 NOTE — TELEPHONE ENCOUNTER
Contacted patient regarding message sent earlier.     Per patient, notes that psychiatry signed off on medication trial.    See TE 01/30/24:    [facility]-Incoming Fax     Response:   It would be reasonable to try Sinemet to help with her movement symptoms.     Sincerely: Hieu Garvey MD.      Response scanned into patient chart.     Era Pedroza MA     Additional information: Psychiatry reduced the medication of Abilify to 2.5 mg 1x daily.      Routing to provider.    Renée Wu MA  [facility]

## 2024-02-12 NOTE — TELEPHONE ENCOUNTER
Spoke to patient and instructed per provider directions below. No further questions or concerns at this time.    Renée ARIS Wu MA  [facility] Neurology Clinic    I just sent in for sinemet 25/100mg to her [facility].    Could you please go over this again with her (I mentioned in clinic but she may not remember):    Even though the bottle says  1.5 tablets three times a day, we will start at a low dose and move up slowly.    For the first week, she takes 1/2 tablet three times a day (before breakfast, lunch, and dinner)    For the second week, increase to 1 tablet and keep at the same times    For the third week, increase to 1.5 tablets and keep at the same times, stay there until I see her back in clinic.    The most common side effect is upset stomach, nausea, but anything that feels off about it when she increases it, she can go to the lower amount she was taking and let us know right away so we can troubleshoot.    It is preferable that she takes that on an empty stomach (30min prior to meals, or 1h after meals) to help absorb it better. However if her nausea is just too much because of the meds, she can do that with food, and eventually the body will get used to the med dose and the nausea will go away.    Thanks

## 2024-03-13 ENCOUNTER — TELEPHONE (OUTPATIENT)
Dept: NEUROLOGY | Facility: CLINIC | Age: 64
End: 2024-03-13
Payer: COMMERCIAL

## 2024-06-11 ENCOUNTER — OFFICE VISIT (OUTPATIENT)
Dept: NEUROLOGY | Facility: CLINIC | Age: 64
End: 2024-06-11
Payer: COMMERCIAL

## 2024-06-11 VITALS — SYSTOLIC BLOOD PRESSURE: 121 MMHG | DIASTOLIC BLOOD PRESSURE: 81 MMHG | HEART RATE: 70 BPM | OXYGEN SATURATION: 95 %

## 2024-06-11 DIAGNOSIS — T43.505A NEUROLEPTIC-INDUCED PARKINSONISM (H): Primary | ICD-10-CM

## 2024-06-11 DIAGNOSIS — G24.01 TARDIVE SYNDROME: ICD-10-CM

## 2024-06-11 DIAGNOSIS — G21.11 NEUROLEPTIC-INDUCED PARKINSONISM (H): Primary | ICD-10-CM

## 2024-06-11 PROCEDURE — G2211 COMPLEX E/M VISIT ADD ON: HCPCS | Performed by: PSYCHIATRY & NEUROLOGY

## 2024-06-11 PROCEDURE — 99214 OFFICE O/P EST MOD 30 MIN: CPT | Performed by: PSYCHIATRY & NEUROLOGY

## 2024-06-11 RX ORDER — BENZTROPINE MESYLATE 1 MG/1
1 TABLET ORAL 2 TIMES DAILY
Qty: 180 TABLET | Refills: 3 | Status: SHIPPED | OUTPATIENT
Start: 2024-06-11 | End: 2024-09-09

## 2024-06-11 ASSESSMENT — UNIFIED PARKINSONS DISEASE RATING SCALE (UPDRS)
LEG_AGILITY_RIGHT: (0) NORMAL: NO PROBLEMS.
RIGIDITY_RUE: (0) NORMAL: NO RIGIDITY.
TOTAL_SCORE: 1
PARKINSONS_MEDS: ON
SPONTANEITY_OF_MOVEMENT: (1) SLIGHT: SLIGHT GLOBAL SLOWNESS AND POVERTY OF SPONTANEOUS MOVEMENTS.
LEG_AGILITY_LEFT: (0) NORMAL: NO PROBLEMS.
AMPLITUDE_RUE: (0) NORMAL: NO TREMOR.
FACIAL_EXPRESSION: (1) SLIGHT: MINIMAL MASKED FACIES MANIFESTED ONLY BY DECREASED FREQUENCY OF BLINKING.
RIGIDITY_NECK: (0) NORMAL: NO RIGIDITY.
AMPLITUDE_LLE: (0) NORMAL: NO TREMOR.
AXIAL_SCORE: 5
MOVEMENTS_INTERFERE_WITH_RATINGS: NO
RIGIDITY_RLE: (0) NORMAL: NO RIGIDITY.
AMPLITUDE_LUE: (0) NORMAL: NO TREMOR.
TOETAPPING_RIGHT: (0) NORMAL: NO PROBLEMS.
FINGER_TAPPING_RIGHT: (1) SLIGHT: ANY OF THE FOLLOWING: A) THE REGULAR RHYTHM IS BROKEN WITH ONE WITH ONE OR TWO INTERRUPTIONS OR HESITATIONS OF THE MOVEMENT  B) SLIGHT SLOWING  C) THE AMPLITUDE DECREMENTS NEAR THE END OF THE 10 MOVEMENTS.
AMPLITUDE_RLE: (0) NORMAL: NO TREMOR.
CONSTANCY_TREMOR_ATREST: (0) NORMAL: NO TREMOR.
POSTURAL_STABILITY: (1) SLIGHT: 3-5 STEPS, BUT RECOVERS UNAIDED.
PRONATION_SUPINATION_RIGHT: (0) NORMAL: NO PROBLEMS.
DYSKINESIAS_PRESENT: NO
FREEZING_GAIT: (0) NORMAL: NO FREEZING.
AMPLITUDE_LIP_JAW: (0) NORMAL: NO TREMOR.
HANDMOVEMENTS_RIGHT: (0) NORMAL: NO PROBLEMS.
TOTAL_SCORE: 9
POSTURE: (1) SLIGHT: NOT QUITE ERECT, BUT COULD BE NORMAL FOR OLDER PERSONS.
PRONATION_SUPINATION_LEFT: (1) SLIGHT: ANY OF THE FOLLOWING: A) THE REGULAR RHYTHM IS BROKEN WITH ONE WITH ONE OR TWO INTERRUPTIONS OR HESITATIONS OF THE MOVEMENT  B) SLIGHT SLOWING  C) THE AMPLITUDE DECREMENTS NEAR THE END OF THE 10 MOVEMENTS.
TOETAPPING_LEFT: (0) NORMAL: NO PROBLEMS.
FINGER_TAPPING_LEFT: (1) SLIGHT: ANY OF THE FOLLOWING: A) THE REGULAR RHYTHM IS BROKEN WITH ONE WITH ONE OR TWO INTERRUPTIONS OR HESITATIONS OF THE MOVEMENT  B) SLIGHT SLOWING  C) THE AMPLITUDE DECREMENTS NEAR THE END OF THE 10 MOVEMENTS.
TOTAL_SCORE_LEFT: 3
GAIT: (1) SLIGHT: INDEPENDENT WALKING WITH MINOR GAIT IMPAIRMENT.
HANDMOVEMENTS_LEFT: (1) SLIGHT: ANY OF THE FOLLOWING: A) THE REGULAR RHYTHM IS BROKEN WITH ONE WITH ONE OR TWO INTERRUPTIONS OR HESITATIONS OF THE MOVEMENT  B) SLIGHT SLOWING  C) THE AMPLITUDE DECREMENTS NEAR THE END OF THE 10 MOVEMENTS.
SPEECH: (0) NORMAL: NO SPEECH PROBLEMS.
RIGIDITY_LUE: (0) NORMAL: NO RIGIDITY.
RIGIDITY_LLE: (0) NORMAL: NO RIGIDITY.
ARISING_CHAIR: (0) NORMAL: NO PROBLEMS. ABLE TO ARISE QUICKLY WITHOUT HESITATION.

## 2024-06-11 NOTE — PROGRESS NOTES
Department of Neurology  Movement Disorders Division   Return Patient Visit    Patient: Carmen Carvajal   MRN: [id number]   : 1960   Date of Visit: 2024  PCP: Carolyn Duong   Referring provider: No ref. provider found    CC:  Suspected tardive syndrome    Interval history:  Ms. Carvajal is a 64 year old female with history significant for multiple psychiatric problems, with chronic exposure to Abilify who presents to movement disorder clinic for follow-up. Last visit was 2024, with a plan to start a very low-dose of levodopa and reassess. She is accompanied by family, who assists with collateral history.    Overall she is done well.  No issues tolerating levodopa.  She has been doing well being completely off the amantadine as we discussed last visit.  She does complain, however, of still not being able to stand because her legs feel like moving.  Even with sitting down her toes seem to be wiggling all the time.    Other than that no falls, no speech issues no swallowing issues.  She tries to stay active the best she can.    She has been gradually reduced on her dosage of Abilify by her psychiatrist, but she is still on a very low-dose of it.  About 6 weeks ago she had her Abilify dose reduced to 2.5 mg.  And she seems to be doing well in terms of tolerating the reduction.      ROS:  All others negative except as listed above. Pertinent movement disorders-specific ROS listed above.    Past Medical History:   Diagnosis Date    Hypertension     Retinal detachment     right eye retina tear repair         Past Surgical History:   Procedure Laterality Date    RETINAL REATTACHMENT      laser retinopexy  and 2014 right eye    thyroid  's    radiation          Current Outpatient Medications:     AMLODIPINE BESYLATE PO, Take 10 mg by mouth daily, Disp: , Rfl:     ARIPiprazole (ABILIFY) 10 MG tablet, Take 2.5 mg by mouth daily, Disp: , Rfl:     benztropine (COGENTIN) 1 MG  tablet, Take 1 tablet (1 mg) by mouth 2 times daily, Disp: 180 tablet, Rfl: 3    Cholecalciferol (VITAMIN D3 PO), Take 2,000 Units by mouth daily, Disp: , Rfl:     LEVOTHYROXINE SODIUM PO, Take 125 mcg by mouth daily, Disp: , Rfl:     losartan-hydrochlorothiazide (HYZAAR) 100-12.5 MG per tablet, Take 1 tablet by mouth daily, Disp: , Rfl:     methylcellulose (CITRUCEL) 500 MG TABS, Take 500 mg by mouth daily, Disp: , Rfl:     sertraline (ZOLOFT) 100 MG tablet, Take 100 mg by mouth daily, Disp: , Rfl:     vitamin B complex with vitamin C (STRESS TAB) tablet, Take 1 tablet by mouth daily, Disp: , Rfl:     vitamin C (ASCORBIC ACID) 500 MG tablet, Take 1,000 mg by mouth, Disp: , Rfl:     carbidopa-levodopa (SINEMET)  MG tablet, Take 1.5 tablets by mouth 3 times daily, Disp: 405 tablet, Rfl: 3    multivitamin, therapeutic (THERA-VIT) TABS, Take 1 tablet by mouth daily (Patient not taking: Reported on 1/29/2024), Disp: , Rfl:      Allergies   Allergen Reactions    Ace Inhibitors Cough     Verified in Meditech: Y, Severity in Meditech: S  HUT Comment: Verified in Meditech: Y, Severity in Meditech: S; HUT Reaction: Cough          Family History   Problem Relation Age of Onset    Retinal detachment Mother         Social History:  She  reports that she has never smoked. She has never used smokeless tobacco. She reports that she does not drink alcohol.     PHYSICAL EXAM:  /81   Pulse 70   SpO2 95%      NEURO:  UPDRS  Time:: 0933  Medication: On  R Brain DBS:: None  L Brain DBS:: None  Dyskinesia (LID): No  Did LID interfere: No  Speech: (0) Normal: No speech problems.  Facial Expression: (1) Slight: Minimal masked facies manifested only by decreased frequency of blinking.  Rigidity Neck: (0) Normal: No rigidity.  Rigidity RUE: (0) Normal: No rigidity.  Rigidity LUE: (0) Normal: No rigidity.  Rigidity RLE: (0) Normal: No rigidity.  Rigidity LLE: (0) Normal: No rigidity.  Finger Taps R: (1) Slight: Any of the  following: a) the regular rhythm is broken with one with one or two interruptions or hesitations of the movement  b) slight slowing  c) the amplitude decrements near the end of the 10 movements.  Finger Taps L: (1) Slight: Any of the following: a) the regular rhythm is broken with one with one or two interruptions or hesitations of the movement  b) slight slowing  c) the amplitude decrements near the end of the 10 movements.  Hand Mvt R: (0) Normal: No problems.  Hand Mvt L: (1) Slight: Any of the following: a) the regular rhythm is broken with one with one or two interruptions or hesitations of the movement  b) slight slowing  c) the amplitude decrements near the end of the 10 movements.  Pron-/Supinate R: (0) Normal: No problems.  Pron-/Supinate L: (1) Slight: Any of the following: a) the regular rhythm is broken with one with one or two interruptions or hesitations of the movement  b) slight slowing  c) the amplitude decrements near the end of the 10 movements.  Toe Tap R: (0) Normal: No problems.  Toe Tap L: (0) Normal: No problems.  Leg Agility R: (0) Normal: No problems.  Leg Agility L: (0) Normal: No problems.  Arise From Chair: (0) Normal: No problems. Able to arise quickly without hesitation.  Gait: (1) Slight: Independent walking with minor gait impairment.  Gait Freezing: (0) Normal: No freezing.  Postural Stability: (1) Slight: 3-5 steps, but recovers unaided.  Posture: (1) Slight: Not quite erect, but could be normal for older persons.  Global Spont Mvt: (1) Slight: Slight global slowness and poverty of spontaneous movements.  Postural Tremor RUE: (0) Normal: No tremor.  Postural Tremor LUE: (0) Normal: No tremor.  Kinetic Tremor RUE: (0) Normal: No tremor.  Kinetic Tremor LUE: (0) Normal: No tremor.  Rest Tremor RUE: (0) Normal: No tremor.  Rest Tremor LUE: (0) Normal: No tremor.  Rest Tremor RLE: (0) Normal: No tremor.  Rest Tremor LLE: (0) Normal: No tremor.  Rest Tremor Lip/Jaw: (0) Normal: No  tremor.  Rest Tremor Constancy: (0) Normal: No tremor.  Total Right: 1  Total Left: 3  Axial Total: 5  Total: 9  Movement disorders observations: Presence of involuntary, hyperkinetic movements in bilateral ankles, feet, toes, with a clear overflow phenomenon.      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  It seems like the parkinsonian component has responded to the low-dose of levodopa, however her most disabling symptom at this point seems to be the dyskinetic movements which I suspect may be impacting her balance.    PLAN:  -Reviewed impression and plan with patient and family    - We will continue the same dosage of levodopa right now which is 25/100 mg, 1 tablet 3 times a day.  She is having no side effects to it.    - We will start benztropine at 0.5 mg twice a day for the first 2 weeks, with plans to increase to 1 mg twice daily, provided that she does not experience any side effects.  We can potentially increase that medication further, however we need to monitor closely for side effects.  Her psychiatrist also should be able to monitor her progress.    - We again went over the mechanism that is underlying her condition, I do suspect that it is related to chronic exposure to a dopamine blocking agent, and she should be taken off Abilify if there are alternatives that are more forgiving in terms of causing these symptoms.  He seems like she is tolerating it well the weaning of process which is reassuring and hopefully there will be other alternatives.    - Continue supportive care otherwise.  Will plan on regrouping the next 4 to 6 months.  Sooner if needed.  Encouraged to contact back should there be any questions concerns or any other issues that may arise until then.    Patient Instructions   Dear Carmen,    After the visit  today we discussed the following:    - We do think that everything could be related to a medication that blocks dopamine in your brain called Abilify    - You had 2 different problems, one was Parkinson's-like symptoms, and the other was those involuntary, dance-like movements affecting your legs and feet.     - The carbidopa/levodopa seems to have improved the Parkinson's-like symptoms, but for the dance-like movements in your legs more levodopa will probably not help much so we should try a low-dose of a different medication called benztropine.  We will start with 0.5 mg (half tablet), which we will do twice a day for the first 2 weeks.  Then, if you are doing okay, and no side effects such as the ones we discussed in clinic, and the movements are still present, you can increase to a full tablet (1 mg) which you will do twice a day.  As we discussed in clinic, we can still go up on that dose in the future, however we will go up slowly because of the potential sedative and cognitive side effects.    - Otherwise we will plan on seeing him back in about 4 to 6 months.  Sooner if needed.  Keep me posted on how you do.      Andrea Calderon MD (Leo) (he/him/his)   of Neurology  [facility]  Department of Neurology      Thank you for referring Carmen Britoton to our [facility] Movement Disorders Program, we appreciate the opportunity of being your partner in healthcare. Please feel free to reach out to us at any point if any questions or concerns about this visit.    Attending attestation: Today I spent a total 30 minutes on this case, in face-to-face, examining, obtaining history, providing education and coordination of care. Additional time was spent in chart review, ancillary data, and documentation as delineated above.    The longitudinal plan of care for Carmen was addressed during this visit. Due to the added complexity in care, I will continue to support Carmen Carvajal in the  subsequent management of this condition(s) and with the ongoing continuity of care of this condition(s).

## 2024-06-11 NOTE — PATIENT INSTRUCTIONS
Dear Carmen,    After the visit today we discussed the following:    - We do think that everything could be related to a medication that blocks dopamine in your brain called Abilify    - You had 2 different problems, one was Parkinson's-like symptoms, and the other was those involuntary, dance-like movements affecting your legs and feet.     - The carbidopa/levodopa seems to have improved the Parkinson's-like symptoms, but for the dance-like movements in your legs more levodopa will probably not help much so we should try a low-dose of a different medication called benztropine.  We will start with 0.5 mg (half tablet), which we will do twice a day for the first 2 weeks.  Then, if you are doing okay, and no side effects such as the ones we discussed in clinic, and the movements are still present, you can increase to a full tablet (1 mg) which you will do twice a day.  As we discussed in clinic, we can still go up on that dose in the future, however we will go up slowly because of the potential sedative and cognitive side effects.    - Otherwise we will plan on seeing him back in about 4 to 6 months.  Sooner if needed.  Keep me posted on how you do.

## 2024-06-11 NOTE — LETTER
2024      Carmen Carvajal  [address] [facility] [address]      Dear Colleague,    Thank you for referring your patient, Carmen Carvajal, to the [facility] NEUROLOG[facility]. Please see a copy of my visit note below.    Department of Neurology  Movement Disorders Division   Return Patient Visit    Patient: Carmen Carvajal   MRN: [id number]   : 1960   Date of Visit: 2024  PCP: Carolyn Duong   Referring provider: No ref. provider found    CC:  Suspected tardive syndrome    Interval history:  Ms. Carvajal is a 64 year old female with history significant for multiple psychiatric problems, with chronic exposure to Abilify who presents to movement disorder clinic for follow-up. Last visit was 2024, with a plan to start a very low-dose of levodopa and reassess. She is accompanied by family, who assists with collateral history.    Overall she is done well.  No issues tolerating levodopa.  She has been doing well being completely off the amantadine as we discussed last visit.  She does complain, however, of still not being able to stand because her legs feel like moving.  Even with sitting down her toes seem to be wiggling all the time.    Other than that no falls, no speech issues no swallowing issues.  She tries to stay active the best she can.    She has been gradually reduced on her dosage of Abilify by her psychiatrist, but she is still on a very low-dose of it.  About 6 weeks ago she had her Abilify dose reduced to 2.5 mg.  And she seems to be doing well in terms of tolerating the reduction.      ROS:  All others negative except as listed above. Pertinent movement disorders-specific ROS listed above.    Past Medical History:   Diagnosis Date     Hypertension      Retinal detachment     right eye retina tear repair         Past Surgical History:   Procedure Laterality Date     RETINAL REATTACHMENT      laser retinopexy  and 2014 right eye      thyroid  1990's    radiation          Current Outpatient Medications:      AMLODIPINE BESYLATE PO, Take 10 mg by mouth daily, Disp: , Rfl:      ARIPiprazole (ABILIFY) 10 MG tablet, Take 2.5 mg by mouth daily, Disp: , Rfl:      benztropine (COGENTIN) 1 MG tablet, Take 1 tablet (1 mg) by mouth 2 times daily, Disp: 180 tablet, Rfl: 3     Cholecalciferol (VITAMIN D3 PO), Take 2,000 Units by mouth daily, Disp: , Rfl:      LEVOTHYROXINE SODIUM PO, Take 125 mcg by mouth daily, Disp: , Rfl:      losartan-hydrochlorothiazide (HYZAAR) 100-12.5 MG per tablet, Take 1 tablet by mouth daily, Disp: , Rfl:      methylcellulose (CITRUCEL) 500 MG TABS, Take 500 mg by mouth daily, Disp: , Rfl:      sertraline (ZOLOFT) 100 MG tablet, Take 100 mg by mouth daily, Disp: , Rfl:      vitamin B complex with vitamin C (STRESS TAB) tablet, Take 1 tablet by mouth daily, Disp: , Rfl:      vitamin C (ASCORBIC ACID) 500 MG tablet, Take 1,000 mg by mouth, Disp: , Rfl:      carbidopa-levodopa (SINEMET)  MG tablet, Take 1.5 tablets by mouth 3 times daily, Disp: 405 tablet, Rfl: 3     multivitamin, therapeutic (THERA-VIT) TABS, Take 1 tablet by mouth daily (Patient not taking: Reported on 1/29/2024), Disp: , Rfl:      Allergies   Allergen Reactions     Ace Inhibitors Cough     Verified in Meditech: Y, Severity in Meditech: S  HUT Comment: Verified in Meditech: Y, Severity in Meditech: S; HUT Reaction: Cough          Family History   Problem Relation Age of Onset     Retinal detachment Mother         Social History:  She  reports that she has never smoked. She has never used smokeless tobacco. She reports that she does not drink alcohol.     PHYSICAL EXAM:  /81   Pulse 70   SpO2 95%      NEURO:  UPDRS  Time:: 0933  Medication: On  R Brain DBS:: None  L Brain DBS:: None  Dyskinesia (LID): No  Did LID interfere: No  Speech: (0) Normal: No speech problems.  Facial Expression: (1) Slight: Minimal masked facies manifested only by decreased  frequency of blinking.  Rigidity Neck: (0) Normal: No rigidity.  Rigidity RUE: (0) Normal: No rigidity.  Rigidity LUE: (0) Normal: No rigidity.  Rigidity RLE: (0) Normal: No rigidity.  Rigidity LLE: (0) Normal: No rigidity.  Finger Taps R: (1) Slight: Any of the following: a) the regular rhythm is broken with one with one or two interruptions or hesitations of the movement  b) slight slowing  c) the amplitude decrements near the end of the 10 movements.  Finger Taps L: (1) Slight: Any of the following: a) the regular rhythm is broken with one with one or two interruptions or hesitations of the movement  b) slight slowing  c) the amplitude decrements near the end of the 10 movements.  Hand Mvt R: (0) Normal: No problems.  Hand Mvt L: (1) Slight: Any of the following: a) the regular rhythm is broken with one with one or two interruptions or hesitations of the movement  b) slight slowing  c) the amplitude decrements near the end of the 10 movements.  Pron-/Supinate R: (0) Normal: No problems.  Pron-/Supinate L: (1) Slight: Any of the following: a) the regular rhythm is broken with one with one or two interruptions or hesitations of the movement  b) slight slowing  c) the amplitude decrements near the end of the 10 movements.  Toe Tap R: (0) Normal: No problems.  Toe Tap L: (0) Normal: No problems.  Leg Agility R: (0) Normal: No problems.  Leg Agility L: (0) Normal: No problems.  Arise From Chair: (0) Normal: No problems. Able to arise quickly without hesitation.  Gait: (1) Slight: Independent walking with minor gait impairment.  Gait Freezing: (0) Normal: No freezing.  Postural Stability: (1) Slight: 3-5 steps, but recovers unaided.  Posture: (1) Slight: Not quite erect, but could be normal for older persons.  Global Spont Mvt: (1) Slight: Slight global slowness and poverty of spontaneous movements.  Postural Tremor RUE: (0) Normal: No tremor.  Postural Tremor LUE: (0) Normal: No tremor.  Kinetic Tremor RUE: (0)  Normal: No tremor.  Kinetic Tremor LUE: (0) Normal: No tremor.  Rest Tremor RUE: (0) Normal: No tremor.  Rest Tremor LUE: (0) Normal: No tremor.  Rest Tremor RLE: (0) Normal: No tremor.  Rest Tremor LLE: (0) Normal: No tremor.  Rest Tremor Lip/Jaw: (0) Normal: No tremor.  Rest Tremor Constancy: (0) Normal: No tremor.  Total Right: 1  Total Left: 3  Axial Total: 5  Total: 9  Movement disorders observations: Presence of involuntary, hyperkinetic movements in bilateral ankles, feet, toes, with a clear overflow phenomenon.      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  It seems like the parkinsonian component has responded to the low-dose of levodopa, however her most disabling symptom at this point seems to be the dyskinetic movements which I suspect may be impacting her balance.    PLAN:  -Reviewed impression and plan with patient and family    - We will continue the same dosage of levodopa right now which is 25/100 mg, 1 tablet 3 times a day.  She is having no side effects to it.    - We will start benztropine at 0.5 mg twice a day for the first 2 weeks, with plans to increase to 1 mg twice daily, provided that she does not experience any side effects.  We can potentially increase that medication further, however we need to monitor closely for side effects.  Her psychiatrist also should be able to monitor her progress.    - We again went over the mechanism that is underlying her condition, I do suspect that it is related to chronic exposure to a dopamine blocking agent, and she should be taken off Abilify if there are alternatives that are more forgiving in terms of causing these symptoms.  He seems like she is tolerating it well the weaning of process which is reassuring and hopefully there will be other alternatives.    - Continue  supportive care otherwise.  Will plan on regrouping the next 4 to 6 months.  Sooner if needed.  Encouraged to contact back should there be any questions concerns or any other issues that may arise until then.    Patient Instructions   Dear Carmen,    After the visit today we discussed the following:    - We do think that everything could be related to a medication that blocks dopamine in your brain called Abilify    - You had 2 different problems, one was Parkinson's-like symptoms, and the other was those involuntary, dance-like movements affecting your legs and feet.     - The carbidopa/levodopa seems to have improved the Parkinson's-like symptoms, but for the dance-like movements in your legs more levodopa will probably not help much so we should try a low-dose of a different medication called benztropine.  We will start with 0.5 mg (half tablet), which we will do twice a day for the first 2 weeks.  Then, if you are doing okay, and no side effects such as the ones we discussed in clinic, and the movements are still present, you can increase to a full tablet (1 mg) which you will do twice a day.  As we discussed in clinic, we can still go up on that dose in the future, however we will go up slowly because of the potential sedative and cognitive side effects.    - Otherwise we will plan on seeing him back in about 4 to 6 months.  Sooner if needed.  Keep me posted on how you do.      Andrea Calderon MD (Leo) (he/him/his)   of Neurology  [facility]  Department of Neurology      Thank you for referring Carmen Carvajal to our [facility] Movement Disorders Program, we appreciate the opportunity of being your partner in healthcare. Please feel free to reach out to us at any point if any questions or concerns about this visit.    Attending attestation: Today I spent a total 30 minutes on this case, in face-to-face, examining, obtaining history, providing education and coordination of care.  Additional time was spent in chart review, ancillary data, and documentation as delineated above.    The longitudinal plan of care for Carmen was addressed during this visit. Due to the added complexity in care, I will continue to support Carmen Carvajal in the subsequent management of this condition(s) and with the ongoing continuity of care of this condition(s).      Again, thank you for allowing me to participate in the care of your patient.        Sincerely,        Andrea Mason MD

## 2024-09-09 ENCOUNTER — OFFICE VISIT (OUTPATIENT)
Dept: NEUROLOGY | Facility: CLINIC | Age: 64
End: 2024-09-09
Payer: COMMERCIAL

## 2024-09-09 VITALS — SYSTOLIC BLOOD PRESSURE: 127 MMHG | OXYGEN SATURATION: 96 % | DIASTOLIC BLOOD PRESSURE: 77 MMHG | HEART RATE: 71 BPM

## 2024-09-09 DIAGNOSIS — T43.505A NEUROLEPTIC-INDUCED PARKINSONISM (H): ICD-10-CM

## 2024-09-09 DIAGNOSIS — G21.11 NEUROLEPTIC-INDUCED PARKINSONISM (H): ICD-10-CM

## 2024-09-09 DIAGNOSIS — G24.01 TARDIVE SYNDROME: ICD-10-CM

## 2024-09-09 PROCEDURE — G2211 COMPLEX E/M VISIT ADD ON: HCPCS | Performed by: PSYCHIATRY & NEUROLOGY

## 2024-09-09 PROCEDURE — 99214 OFFICE O/P EST MOD 30 MIN: CPT | Mod: GC | Performed by: PSYCHIATRY & NEUROLOGY

## 2024-09-09 RX ORDER — BENZTROPINE MESYLATE 1 MG/1
TABLET ORAL
Qty: 270 TABLET | Refills: 4 | Status: SHIPPED | OUTPATIENT
Start: 2024-09-09

## 2024-09-09 ASSESSMENT — UNIFIED PARKINSONS DISEASE RATING SCALE (UPDRS)
TOTAL_SCORE_LEFT: 2
HANDMOVEMENTS_RIGHT: (0) NORMAL: NO PROBLEMS.
AMPLITUDE_RUE: (0) NORMAL: NO TREMOR.
RIGIDITY_LLE: (0) NORMAL: NO RIGIDITY.
FINGER_TAPPING_LEFT: (1) SLIGHT: ANY OF THE FOLLOWING: A) THE REGULAR RHYTHM IS BROKEN WITH ONE WITH ONE OR TWO INTERRUPTIONS OR HESITATIONS OF THE MOVEMENT  B) SLIGHT SLOWING  C) THE AMPLITUDE DECREMENTS NEAR THE END OF THE 10 MOVEMENTS.
ARISING_CHAIR: (0) NORMAL: NO PROBLEMS. ABLE TO ARISE QUICKLY WITHOUT HESITATION.
RIGIDITY_LUE: (0) NORMAL: NO RIGIDITY.
AMPLITUDE_LIP_JAW: (0) NORMAL: NO TREMOR.
FINGER_TAPPING_RIGHT: (0) NORMAL: NO PROBLEMS.
FREEZING_GAIT: (0) NORMAL: NO FREEZING.
HANDMOVEMENTS_LEFT: (0) NORMAL: NO PROBLEMS.
RIGIDITY_RLE: (0) NORMAL: NO RIGIDITY.
SPEECH: (0) NORMAL: NO SPEECH PROBLEMS.
PARKINSONS_MEDS: ON
RIGIDITY_RUE: (0) NORMAL: NO RIGIDITY.
LEG_AGILITY_RIGHT: (0) NORMAL: NO PROBLEMS.
TOETAPPING_LEFT: (0) NORMAL: NO PROBLEMS.
TOTAL_SCORE: 8
LEG_AGILITY_LEFT: (0) NORMAL: NO PROBLEMS.
AMPLITUDE_LLE: (0) NORMAL: NO TREMOR.
AMPLITUDE_LUE: (0) NORMAL: NO TREMOR.
AMPLITUDE_RLE: (0) NORMAL: NO TREMOR.
PRONATION_SUPINATION_LEFT: (1) SLIGHT: ANY OF THE FOLLOWING: A) THE REGULAR RHYTHM IS BROKEN WITH ONE WITH ONE OR TWO INTERRUPTIONS OR HESITATIONS OF THE MOVEMENT  B) SLIGHT SLOWING  C) THE AMPLITUDE DECREMENTS NEAR THE END OF THE 10 MOVEMENTS.
DYSKINESIAS_PRESENT: YES
PRONATION_SUPINATION_RIGHT: (0) NORMAL: NO PROBLEMS.
TOTAL_SCORE: 1
POSTURE: (1) SLIGHT: NOT QUITE ERECT, BUT COULD BE NORMAL FOR OLDER PERSONS.
TOETAPPING_RIGHT: (0) NORMAL: NO PROBLEMS.
RIGIDITY_NECK: (0) NORMAL: NO RIGIDITY.
FACIAL_EXPRESSION: (1) SLIGHT: MINIMAL MASKED FACIES MANIFESTED ONLY BY DECREASED FREQUENCY OF BLINKING.
POSTURAL_STABILITY: (1) SLIGHT: 3-5 STEPS, BUT RECOVERS UNAIDED.
SPONTANEITY_OF_MOVEMENT: (1) SLIGHT: SLIGHT GLOBAL SLOWNESS AND POVERTY OF SPONTANEOUS MOVEMENTS.
AXIAL_SCORE: 5
CONSTANCY_TREMOR_ATREST: (0) NORMAL: NO TREMOR.
MOVEMENTS_INTERFERE_WITH_RATINGS: NO
GAIT: (1) SLIGHT: INDEPENDENT WALKING WITH MINOR GAIT IMPAIRMENT.

## 2024-09-09 NOTE — LETTER
2024      Carmen Carvajal  [address] [facility] [address]      Dear Colleague,    Thank you for referring your patient, Carmen Carvajal, to the [facility]. Please see a copy of my visit note below.    Department of Neurology  Movement Disorders Division   Return Patient Visit    Patient: Carmen Carvajal   MRN: [id number]   : 1960   Date of Visit: 24   PCP: Carolyn Duong   Referring provider: No ref. provider found    CC:  Suspected tardive syndrome    Interval history:  Ms. Carvajal is a 64 year old female with history significant for multiple psychiatric problems, with chronic exposure to Abilify who presents to movement disorder clinic for follow-up. Last visit was 2024 at which time she was prescribed benztropine for dyskinesias.     Ms. Carvajal presents with her .    She reports her movements might be a little better with the new medications. She is not sure how her symptoms would be if she weren't taking benztropine and carbidopa/levodopa, but doesn't feel like there is a big difference between how she has been doing since her last appointment and how she was doing prior to that. Movements are all in her legs - she never notices it in her face or hands. She has had people tell her wear the public restrooms are when she is standing due to her leg movements. Her  notes that she tends to kick her right leg repeatedly when lying in bed.     Her Abilify has been decreased down to 2.5 mg. Her mental health provider has told her that if Dr. Calderon is % sure that her Abilify is causing her movements, then they could try taking her off it entirely. She has tolerated this wean well. Currently she denies feeling depressed or anxious. She denies any active or passive suicidal ideations. She feels that her mental health is under good control.     She hasn't notice any particular pattern to her abnormal movements except  they tend to quiet at night when she is laying down.     No falls - balance is pretty good if she concentrates on it. She went to physical therapy for a long time. She learned how to navigate around her abnormal movements when walking. She has a sensation of heaviness in her bilateral legs.     No changes to how her voice sounds. No dysphagia. She tends to get a dry cough at night when she goes to bed.     Related Medications AM Noon PM   Carbidopa/Levodopa  mg 1.5 1.5 1.5   Benztropine 1 mg 1  1   Abilify 2.5 mg   1   Sertraline 100 mg   1     Sometimes forgets doses of Carbidopa/Levodopa because three times a day is a lot to remember - she doesn't notice a dramatic difference when she misses a dose but thinks maybe she feels better when the medications are working.     ROS:  All others negative except as listed above. Pertinent movement disorders-specific ROS listed above.    Past Medical History:   Diagnosis Date     Hypertension      Retinal detachment     right eye retina tear repair         Past Surgical History:   Procedure Laterality Date     RETINAL REATTACHMENT      laser retinopexy june and august 2014 right eye     thyroid  1990's    radiation          Current Outpatient Medications:      AMLODIPINE BESYLATE PO, Take 10 mg by mouth daily, Disp: , Rfl:      ARIPiprazole (ABILIFY) 10 MG tablet, Take 2.5 mg by mouth daily, Disp: , Rfl:      benztropine (COGENTIN) 1 MG tablet, Take 1 tablet in the morning, 1.5 tablets at bedtime for two weeks. Then increase to 1.5 tablets twice a day and stay at that dose., Disp: 270 tablet, Rfl: 4     carbidopa-levodopa (SINEMET)  MG tablet, Take 1.5 tablets by mouth 3 times daily, Disp: 405 tablet, Rfl: 3     Cholecalciferol (VITAMIN D3 PO), Take 2,000 Units by mouth daily, Disp: , Rfl:      LEVOTHYROXINE SODIUM PO, Take 125 mcg by mouth daily, Disp: , Rfl:      losartan-hydrochlorothiazide (HYZAAR) 100-12.5 MG per tablet, Take 1 tablet by mouth daily, Disp: ,  Rfl:      methylcellulose (CITRUCEL) 500 MG TABS, Take 500 mg by mouth daily, Disp: , Rfl:      sertraline (ZOLOFT) 100 MG tablet, Take 100 mg by mouth daily, Disp: , Rfl:      vitamin B complex with vitamin C (STRESS TAB) tablet, Take 1 tablet by mouth daily, Disp: , Rfl:      vitamin C (ASCORBIC ACID) 500 MG tablet, Take 1,000 mg by mouth, Disp: , Rfl:      multivitamin, therapeutic (THERA-VIT) TABS, Take 1 tablet by mouth daily (Patient not taking: Reported on 1/29/2024), Disp: , Rfl:      Allergies   Allergen Reactions     Ace Inhibitors Cough     Verified in Meditech: Y, Severity in Meditech: S  HUT Comment: Verified in Meditech: Y, Severity in Meditech: S; HUT Reaction: Cough          Family History   Problem Relation Age of Onset     Retinal detachment Mother         Social History:  She  reports that she has never smoked. She has never used smokeless tobacco. She reports that she does not drink alcohol.     PHYSICAL EXAM:  /77   Pulse 71   SpO2 96%      NEURO:      9/9/2024    10:00 AM   UPDRS Motor Scale   Time: 10:37   Medication On   R Brain DBS: None   L Brain DBS: None   Dyskinesia (LID) Yes   Did LID interfere No   Speech 0   Facial Expression 1   Rigidity Neck 0   Rigidity RUE 0   Rigidity LUE 0   Rigidity RLE 0   Rigidity LLE 0   Finger Taps R 0   Finger Taps L 1   Hand Mvt R 0   Hand Mvt L 0   Pron-/Supinate R 0   Pron-/Supinate L 1   Toe Tap R 0   Toe Tap L 0   Leg Agility R 0   Leg Agility L 0   Arise From Chair 0   Gait 1   Gait Freezing 0   Postural Stability 1   Posture 1   Global Spont Mvt 1   Postural Tremor RUE 1   Postural Tremor LUE 0   Kinetic Tremor RUE 0   Kinetic Tremor LUE 0   Rest Tremor RUE 0   Rest Tremor LUE 0   Rest Tremor RLE 0   Rest Tremor LLE 0   Rest Tremor Lip/Jaw 0   Rest Tremor Constancy 0   Total Right 1   Total Left 2   Axial Total 5   Total 8     Previously 9 in June 2024    Movement disorders observations: Presence of involuntary, hyperkinetic movements in  "bilateral ankles, feet, toes, with a clear overflow phenomenon. Less consistently present than at her last appointment. 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 Her leg movements remain most consistent with tardive phenomena from Abilify. Her movements do appear better today compared to prior exams - it is unclear if this is from Abilify washing out with prior wean or the Benztropine she was started on at her prior appointment. We continue to recommend stopping Abilify if possible, advised that she touch base with her psychiatrist to work on this. She remains bothered by her leg movements and would like to try increasing Benztropine while working on plan for Abilify. Will plan to increase to 1.5 mg Benztropine over a couple of weeks.     PLAN:  -Reviewed impression and plan with patient and family    - We will continue the same dosage of levodopa right now which is 25/100 mg, 1.5 tablet 3 times a day.  She is having no side effects to it.    - We will start increase benztropine gently - initially 1 mg qam, 1.5 mg qpm for several weeks, then increase to 1.5 mg BID and stay at that dose.     - We again went over the mechanism that is underlying her condition, I do suspect that it is related to chronic exposure to a dopamine blocking agent, and she should be taken off Abilify if there are alternatives that are more forgiving in terms of causing these symptoms.  She is tolerating the weaning of process well which is reassuring and hopefully there will be other alternatives.    - Continue supportive care otherwise.  Will plan on regrouping the next 4 to 6 months.  Sooner if needed.  Encouraged to contact back should there be any questions concerns or any other issues " that may arise until then.    Patient Instructions   It is not totally clear if you are feeling some benefit from benztropine or if this is from coming down on the Abilify. Your leg movements continue to be consistent with what we see as a side effect of the Abilify.     At this time, we would recommend trying to go off the Abilify entirely and seeing if your leg movements improve. You should work with your psychiatrist on making this change. We will send your psychiatrist our clinic notes. It can take several months to see the full benefit from stopping Abilify.     In the meantime, we can try increasing your benztropine slightly to see if this improves your symptoms. We will have you increase this slowly. Benztropine can cause dry mouth or dry throat. If this gets worse when you increase the dose, please reach out to our clinic and let us know. Try to stay well hydrated. Benztropine can cause sedation so please monitor for increased sleepiness or fatigue with this change.     Related Medications AM Noon PM   Weeks 1-2 1  1.5   Weeks 3 and on 1.5  1.5     We will place a referral to see a speech therapist for a swallow study to make sure there are not issues with your swallowing function.     Follow up in 4-6 months. Please reach out with concerns in the meantime.     Ms. Carvajal was seen and discussed with movement disorder attending, Dr. Calderon.     Bettie Llamas MD  Movement Disorders Fellow    Time Spent: 35 minutes spent on the date of the encounter doing chart review, history and exam, documentation and further activities as noted above      Attestation signed by Andrea Howell MD at 9/24/2024 11:27 AM:  Attestation entered as a separate note.      IAndrea MD, personally interviewed, examined and evaluated this patient. I personally reviewed the vital signs, medications and pertinent labs/imaging. I discussed the patient with Dr. Llamas and agree with the assessment,  examination and plan of care documented, with the additions and/or exceptions delineated below:      Patient with tardive syndrome presenting for follow-up.  She is experiencing some interval improvement in symptoms, it is difficult to say whether that is because of discontinuation of Abilify versus partial benefit from Cogentin.    Because she continues to have some breakthrough symptoms she wanted to try slightly higher dose of Cogentin which I think is reasonable.  She had some very nonspecific complaints pertaining her swallowing, so I think it is reasonable to pursue a swallow assessment which we placed today as well.    We did provide expectations that many times the discontinuation of Abilify can be the initial step towards improvement of those tardive symptoms, however those changes can be very gradual and take place over a period of several months.  Will continue to reassess her and treat her symptomatically for the time being.  I do think that on our examination today we continue to see less signs of the same movement disorders findings from prior assessments.    Will plan on regrouping the next 3 to 6 months.  Sooner if needed.  Encouraged to contact us back should there be any questions, concerns, or any other issues that may arise until then.      The longitudinal plan of care for Carmen was addressed during this visit. Due to the added complexity in care, I will continue to support Carmen Carvajal in the subsequent management of this condition(s) and with the ongoing continuity of care of this condition(s).    Attending attestation: Today a total 30 minutes were spent on this case, in face-to-face, examining, obtaining history, providing education and coordination of care. Additional time was spent in chart review, ancillary data, and documentation as delineated above.      Again, thank you for allowing me to participate in the care of your patient.        Sincerely,        Andrea Mason,  MD

## 2024-09-09 NOTE — NURSING NOTE
"Carmen Carvajal is a 64 year old female who presents for:  Chief Complaint   Patient presents with    Follow Up     Follow Up 3 to 4 month  Movement Disorder         Initial Vitals:  /77   Pulse 71   SpO2 96%  Estimated body mass index is 32.45 kg/m  as calculated from the following:    Height as of 8/23/20: 1.664 m (5' 5.5\").    Weight as of 1/29/24: 89.8 kg (198 lb).. There is no height or weight on file to calculate BSA. BP completed using cuff size: regular  Data Unavailable    Nursing Comments:     Era Pedroza MA   "

## 2024-09-09 NOTE — PROGRESS NOTES
Department of Neurology  Movement Disorders Division   Return Patient Visit    Patient: Carmen Carvajal   MRN: [id number]   : 1960   Date of Visit: 24   PCP: Carolyn Duong   Referring provider: No ref. provider found    CC:  Suspected tardive syndrome    Interval history:  Ms. Carvajal is a 64 year old female with history significant for multiple psychiatric problems, with chronic exposure to Abilify who presents to movement disorder clinic for follow-up. Last visit was 2024 at which time she was prescribed benztropine for dyskinesias.     Ms. Carvajal presents with her .    She reports her movements might be a little better with the new medications. She is not sure how her symptoms would be if she weren't taking benztropine and carbidopa/levodopa, but doesn't feel like there is a big difference between how she has been doing since her last appointment and how she was doing prior to that. Movements are all in her legs - she never notices it in her face or hands. She has had people tell her wear the public restrooms are when she is standing due to her leg movements. Her  notes that she tends to kick her right leg repeatedly when lying in bed.     Her Abilify has been decreased down to 2.5 mg. Her mental health provider has told her that if Dr. Calderon is % sure that her Abilify is causing her movements, then they could try taking her off it entirely. She has tolerated this wean well. Currently she denies feeling depressed or anxious. She denies any active or passive suicidal ideations. She feels that her mental health is under good control.     She hasn't notice any particular pattern to her abnormal movements except they tend to quiet at night when she is laying down.     No falls - balance is pretty good if she concentrates on it. She went to physical therapy for a long time. She learned how to navigate around her abnormal movements when walking. She has a sensation of  heaviness in her bilateral legs.     No changes to how her voice sounds. No dysphagia. She tends to get a dry cough at night when she goes to bed.     Related Medications AM Noon PM   Carbidopa/Levodopa  mg 1.5 1.5 1.5   Benztropine 1 mg 1  1   Abilify 2.5 mg   1   Sertraline 100 mg   1     Sometimes forgets doses of Carbidopa/Levodopa because three times a day is a lot to remember - she doesn't notice a dramatic difference when she misses a dose but thinks maybe she feels better when the medications are working.     ROS:  All others negative except as listed above. Pertinent movement disorders-specific ROS listed above.    Past Medical History:   Diagnosis Date    Hypertension     Retinal detachment     right eye retina tear repair         Past Surgical History:   Procedure Laterality Date    RETINAL REATTACHMENT      laser retinopexy june and august 2014 right eye    thyroid  1990's    radiation          Current Outpatient Medications:     AMLODIPINE BESYLATE PO, Take 10 mg by mouth daily, Disp: , Rfl:     ARIPiprazole (ABILIFY) 10 MG tablet, Take 2.5 mg by mouth daily, Disp: , Rfl:     benztropine (COGENTIN) 1 MG tablet, Take 1 tablet in the morning, 1.5 tablets at bedtime for two weeks. Then increase to 1.5 tablets twice a day and stay at that dose., Disp: 270 tablet, Rfl: 4    carbidopa-levodopa (SINEMET)  MG tablet, Take 1.5 tablets by mouth 3 times daily, Disp: 405 tablet, Rfl: 3    Cholecalciferol (VITAMIN D3 PO), Take 2,000 Units by mouth daily, Disp: , Rfl:     LEVOTHYROXINE SODIUM PO, Take 125 mcg by mouth daily, Disp: , Rfl:     losartan-hydrochlorothiazide (HYZAAR) 100-12.5 MG per tablet, Take 1 tablet by mouth daily, Disp: , Rfl:     methylcellulose (CITRUCEL) 500 MG TABS, Take 500 mg by mouth daily, Disp: , Rfl:     sertraline (ZOLOFT) 100 MG tablet, Take 100 mg by mouth daily, Disp: , Rfl:     vitamin B complex with vitamin C (STRESS TAB) tablet, Take 1 tablet by mouth daily, Disp: ,  "Rfl:     vitamin C (ASCORBIC ACID) 500 MG tablet, Take 1,000 mg by mouth, Disp: , Rfl:     multivitamin, therapeutic (THERA-VIT) TABS, Take 1 tablet by mouth daily (Patient not taking: Reported on 1/29/2024), Disp: , Rfl:      Allergies   Allergen Reactions    Ace Inhibitors Cough     Verified in Meditech: Y, Severity in Meditech: S  HUT Comment: Verified in Meditech: Y, Severity in Meditech: S; HUT Reaction: Cough          Family History   Problem Relation Age of Onset    Retinal detachment Mother         Social History:  She  reports that she has never smoked. She has never used smokeless tobacco. She reports that she does not drink alcohol.     PHYSICAL EXAM:  /77   Pulse 71   SpO2 96%      NEURO:      9/9/2024    10:00 AM   UPDRS Motor Scale   Time: 10:37   Medication On   R Brain DBS: None   L Brain DBS: None   Dyskinesia (LID) Yes   Did LID interfere No   Speech 0   Facial Expression 1   Rigidity Neck 0   Rigidity RUE 0   Rigidity LUE 0   Rigidity RLE 0   Rigidity LLE 0   Finger Taps R 0   Finger Taps L 1   Hand Mvt R 0   Hand Mvt L 0   Pron-/Supinate R 0   Pron-/Supinate L 1   Toe Tap R 0   Toe Tap L 0   Leg Agility R 0   Leg Agility L 0   Arise From Chair 0   Gait 1   Gait Freezing 0   Postural Stability 1   Posture 1   Global Spont Mvt 1   Postural Tremor RUE 1   Postural Tremor LUE 0   Kinetic Tremor RUE 0   Kinetic Tremor LUE 0   Rest Tremor RUE 0   Rest Tremor LUE 0   Rest Tremor RLE 0   Rest Tremor LLE 0   Rest Tremor Lip/Jaw 0   Rest Tremor Constancy 0   Total Right 1   Total Left 2   Axial Total 5   Total 8     Previously 9 in June 2024    Movement disorders observations: Presence of involuntary, hyperkinetic movements in bilateral ankles, feet, toes, with a clear overflow phenomenon. Less consistently present than at her last appointment. 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 Her leg movements remain most consistent with tardive phenomena from Abilify. Her movements do appear better today compared to prior exams - it is unclear if this is from Abilify washing out with prior wean or the Benztropine she was started on at her prior appointment. We continue to recommend stopping Abilify if possible, advised that she touch base with her psychiatrist to work on this. She remains bothered by her leg movements and would like to try increasing Benztropine while working on plan for Abilify. Will plan to increase to 1.5 mg Benztropine over a couple of weeks.     PLAN:  -Reviewed impression and plan with patient and family    - We will continue the same dosage of levodopa right now which is 25/100 mg, 1.5 tablet 3 times a day.  She is having no side effects to it.    - We will start increase benztropine gently - initially 1 mg qam, 1.5 mg qpm for several weeks, then increase to 1.5 mg BID and stay at that dose.     - We again went over the mechanism that is underlying her condition, I do suspect that it is related to chronic exposure to a dopamine blocking agent, and she should be taken off Abilify if there are alternatives that are more forgiving in terms of causing these symptoms.  She is tolerating the weaning of process well which is reassuring and hopefully there will be other alternatives.    - Continue supportive care otherwise.  Will plan on regrouping the next 4 to 6 months.  Sooner if needed.  Encouraged to contact back should there be any questions concerns or any other issues that may arise until then.    Patient Instructions   It is not totally clear if you are feeling some benefit from benztropine or if this is from coming down on the Abilify. Your leg movements continue to be consistent with what we see as a side effect of  the Abilify.     At this time, we would recommend trying to go off the Abilify entirely and seeing if your leg movements improve. You should work with your psychiatrist on making this change. We will send your psychiatrist our clinic notes. It can take several months to see the full benefit from stopping Abilify.     In the meantime, we can try increasing your benztropine slightly to see if this improves your symptoms. We will have you increase this slowly. Benztropine can cause dry mouth or dry throat. If this gets worse when you increase the dose, please reach out to our clinic and let us know. Try to stay well hydrated. Benztropine can cause sedation so please monitor for increased sleepiness or fatigue with this change.     Related Medications AM Noon PM   Weeks 1-2 1  1.5   Weeks 3 and on 1.5  1.5     We will place a referral to see a speech therapist for a swallow study to make sure there are not issues with your swallowing function.     Follow up in 4-6 months. Please reach out with concerns in the meantime.     Ms. Carvajal was seen and discussed with movement disorder attending, Dr. Calderon.     Bettie Llamas MD  Movement Disorders Fellow    Time Spent: 35 minutes spent on the date of the encounter doing chart review, history and exam, documentation and further activities as noted above

## 2024-09-09 NOTE — PATIENT INSTRUCTIONS
It is not totally clear if you are feeling some benefit from benztropine or if this is from coming down on the Abilify. Your leg movements continue to be consistent with what we see as a side effect of the Abilify.     At this time, we would recommend trying to go off the Abilify entirely and seeing if your leg movements improve. You should work with your psychiatrist on making this change. We will send your psychiatrist our clinic notes. It can take several months to see the full benefit from stopping Abilify.     In the meantime, we can try increasing your benztropine slightly to see if this improves your symptoms. We will have you increase this slowly. Benztropine can cause dry mouth or dry throat. If this gets worse when you increase the dose, please reach out to our clinic and let us know. Try to stay well hydrated. Benztropine can cause sedation so please monitor for increased sleepiness or fatigue with this change.     Related Medications AM Noon PM   Weeks 1-2 1  1.5   Weeks 3 and on 1.5  1.5     We will place a referral to see a speech therapist for a swallow study to make sure there are not issues with your swallowing function.     Follow up in 4-6 months. Please reach out with concerns in the meantime.

## 2024-09-24 NOTE — PROGRESS NOTES
I, Andrea Mason MD, personally interviewed, examined and evaluated this patient. I personally reviewed the vital signs, medications and pertinent labs/imaging. I discussed the patient with Dr. Llamas and agree with the assessment, examination and plan of care documented, with the additions and/or exceptions delineated below:      Patient with tardive syndrome presenting for follow-up.  She is experiencing some interval improvement in symptoms, it is difficult to say whether that is because of discontinuation of Abilify versus partial benefit from Cogentin.    Because she continues to have some breakthrough symptoms she wanted to try slightly higher dose of Cogentin which I think is reasonable.  She had some very nonspecific complaints pertaining her swallowing, so I think it is reasonable to pursue a swallow assessment which we placed today as well.    We did provide expectations that many times the discontinuation of Abilify can be the initial step towards improvement of those tardive symptoms, however those changes can be very gradual and take place over a period of several months.  Will continue to reassess her and treat her symptomatically for the time being.  I do think that on our examination today we continue to see less signs of the same movement disorders findings from prior assessments.    Will plan on regrouping the next 3 to 6 months.  Sooner if needed.  Encouraged to contact us back should there be any questions, concerns, or any other issues that may arise until then.      The longitudinal plan of care for Carmen was addressed during this visit. Due to the added complexity in care, I will continue to support Carmen Carvajal in the subsequent management of this condition(s) and with the ongoing continuity of care of this condition(s).    Attending attestation: Today a total 30 minutes were spent on this case, in face-to-face, examining, obtaining history, providing education and  coordination of care. Additional time was spent in chart review, ancillary data, and documentation as delineated above.

## 2024-10-15 ENCOUNTER — HOSPITAL ENCOUNTER (OUTPATIENT)
Dept: SPEECH THERAPY | Facility: CLINIC | Age: 64
Discharge: HOME | End: 2024-10-15
Attending: STUDENT IN AN ORGANIZED HEALTH CARE EDUCATION/TRAINING PROGRAM
Payer: COMMERCIAL

## 2024-10-15 ENCOUNTER — HOSPITAL ENCOUNTER (OUTPATIENT)
Dept: GENERAL RADIOLOGY | Facility: CLINIC | Age: 64
Discharge: HOME | End: 2024-10-15
Attending: STUDENT IN AN ORGANIZED HEALTH CARE EDUCATION/TRAINING PROGRAM
Payer: COMMERCIAL

## 2024-10-15 DIAGNOSIS — G24.01 TARDIVE SYNDROME: ICD-10-CM

## 2024-10-15 DIAGNOSIS — G21.11 NEUROLEPTIC-INDUCED PARKINSONISM (H): ICD-10-CM

## 2024-10-15 DIAGNOSIS — T43.505A NEUROLEPTIC-INDUCED PARKINSONISM (H): ICD-10-CM

## 2024-10-15 PROCEDURE — 92611 MOTION FLUOROSCOPY/SWALLOW: CPT | Mod: GN | Performed by: SPEECH-LANGUAGE PATHOLOGIST

## 2024-10-15 PROCEDURE — 74230 X-RAY XM SWLNG FUNCJ C+: CPT

## 2024-10-15 NOTE — PROGRESS NOTES
"SPEECH LANGUAGE PATHOLOGY EVALUATION    Subjective      Presenting condition or subjective complaint: Per Neurology, She had some very nonspecific complaints pertaining her swallowing, so I think it is reasonable to pursue a swallow assessment which we placed today as well.\" Pt initially reported no dysphagia symptoms and unsure rationale for testing. On further questioning, pt did reported coughing with liquids ~1x/week with no specific trigger. Pt reported quickly recovering from cough and no hx of recurrent pneumonia or respiratory illness. Pt denied any unintentional weight loss and no concerns with solids. Pt did reported occasional drooling at night time.    Relevant medical history:   PMHX: Per Neurology and Movement Disorders clinic, \"Ms. Carvajal is a 64 year old female with history significant for multiple psychiatric problems, with chronic exposure to Abilify who presents to movement disorder clinic for follow-up. Last visit was June 2024 at which time she was prescribed benztropine for dyskinesias. She reports her movements might be a little better with the new medications. She is not sure how her symptoms would be if she weren't taking benztropine and carbidopa/levodopa, but doesn't feel like there is a big difference between how she has been doing since her last appointment and how she was doing prior to that. Movements are all in her legs - she never notices it in her face or hands. No changes to how her voice sounds. No dysphagia. 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    Objective     SWALLOW EVALUATION  Current Diet/Method of Nutritional Intake: thin liquids (level 0), regular diet        VIDEOFLUOROSCOPIC SWALLOW STUDY  Radiologist: Dr. Cazares  Views Taken: left " lateral   Physical location of procedure: [facility]    VFSS textures trialed:   VFSS Eval: Thin Liquids  Mode of Presentation: cup, self-fed   Preparatory Phase: WFL  Oral Phase: WFL  Bolus Location When Swallow Initiated: posterior angle of ramus  Pharyngeal Phase: impaired epiglottic movement  Rosenbeck's Penetration Aspiration Scale: 2 - contrast enters airway, remains above the vocal cords, no residue remains (penetration)  Strategies and Compensations:  small, single sips eliminated flash penetration    Diagnostic Statement: Epiglottis slightly sluggish with challenge trials of consecutive cup gulps; flash penetration as a result; no aspiration throughout; penetration eliminated with small, single sips    VFSS Eval: Purees  Mode of Presentation: self-fed   Preparatory Phase: WFL  Oral Phase: WFL  Bolus Location When Swallow Initiated: posterior angle of ramus  Pharyngeal Phase: WFL  Rosenbeck s Penetration Aspiration Scale: 1 - no aspiration, contrast does not enter airway    Diagnostic Statement: WFL; no penetration, aspiration or pharyngeal residue    VFSS Eval: Solids  Mode of Presentation: self-fed   Preparatory Phase: WFL  Oral Phase: WFL  Bolus Location When Swallow Initiated: posterior angle of ramus  Pharyngeal Phase: WFL   Rosenbeck s Penetration Aspiration Scale: 1 - no aspiration, contrast does not enter airway    Diagnostic Statement: WFL; mild amounts of tongue base and vallecular residue considered WNL with dry crackers; no penetration or aspiration and residue cleared with liquid rinse     ESOPHAGEAL PHASE OF SWALLOW  Slowing in the upper esophagus; consider esophagram if reflux symptoms present (pt currently denies)      SWALLOW ASSESSMENT CLINICAL IMPRESSIONS AND RATIONALE  Diet Consistency Recommendations: thin liquids (level 0), regular diet    Recommended Feeding/Eating Techniques: small bolus size, slow rate of intake   Medication Administration Recommendations: Per pt preference    Assessment &  Plan   CLINICAL IMPRESSIONS   Medical Diagnosis:    Dysphagia  Treatment Diagnosis:   Minimal dysphagia to swallow function WNL  Impression/Assessment:  Pt presents with minimal to no dysphagia on Video Swallow Study. One instance of flash penetration on consecutive gulps of thin liquids related to slightly delayed/sluggish epiglottic inversion.  Pt agreed that infrequent coughing with liquids may be when she is drinking too quickly or distracted. Reviewed all images and reinforced small, single sips at a time and closely monitor movements/dysphagia and re-consult SLP if any changes. No further SLP services at this time.    Risks and benefits of evaluation/treatment have been explained.   Patient/Family/caregiver agrees with Plan of Care.     Evaluation Time:  20     Signing Clinician: Kimberly Joya SLP      [facility]                                                                                   OUTPATIENT SPEECH LANGUAGE PATHOLOGY                          Referring Provider:  Bettie Llamas    Initial Assessment  See Epic Evaluation-

## 2024-11-24 ENCOUNTER — HEALTH MAINTENANCE LETTER (OUTPATIENT)
Age: 64
End: 2024-11-24

## 2025-01-27 ENCOUNTER — OFFICE VISIT (OUTPATIENT)
Dept: NEUROLOGY | Facility: CLINIC | Age: 65
End: 2025-01-27
Payer: COMMERCIAL

## 2025-01-27 VITALS — HEART RATE: 76 BPM | DIASTOLIC BLOOD PRESSURE: 80 MMHG | OXYGEN SATURATION: 96 % | SYSTOLIC BLOOD PRESSURE: 119 MMHG

## 2025-01-27 DIAGNOSIS — G21.11 NEUROLEPTIC-INDUCED PARKINSONISM: Primary | ICD-10-CM

## 2025-01-27 DIAGNOSIS — T43.505A NEUROLEPTIC-INDUCED PARKINSONISM: Primary | ICD-10-CM

## 2025-01-27 ASSESSMENT — UNIFIED PARKINSONS DISEASE RATING SCALE (UPDRS)
AMPLITUDE_RLE: (0) NORMAL: NO TREMOR.
POSTURAL_STABILITY: (0) NORMAL: NO PROBLEMS. RECOVERS WITH ONE OR TWO STEPS.
FINGER_TAPPING_RIGHT: (1) SLIGHT: ANY OF THE FOLLOWING: A) THE REGULAR RHYTHM IS BROKEN WITH ONE WITH ONE OR TWO INTERRUPTIONS OR HESITATIONS OF THE MOVEMENT  B) SLIGHT SLOWING  C) THE AMPLITUDE DECREMENTS NEAR THE END OF THE 10 MOVEMENTS.
PRONATION_SUPINATION_LEFT: (0) NORMAL: NO PROBLEMS.
FINGER_TAPPING_LEFT: (1) SLIGHT: ANY OF THE FOLLOWING: A) THE REGULAR RHYTHM IS BROKEN WITH ONE WITH ONE OR TWO INTERRUPTIONS OR HESITATIONS OF THE MOVEMENT  B) SLIGHT SLOWING  C) THE AMPLITUDE DECREMENTS NEAR THE END OF THE 10 MOVEMENTS.
AMPLITUDE_LUE: (0) NORMAL: NO TREMOR.
CONSTANCY_TREMOR_ATREST: (0) NORMAL: NO TREMOR.
PARKINSONS_MEDS: ON
AXIAL_SCORE: 4
TOTAL_SCORE: 1
GAIT: (1) SLIGHT: INDEPENDENT WALKING WITH MINOR GAIT IMPAIRMENT.
RIGIDITY_LLE: (0) NORMAL: NO RIGIDITY.
TOETAPPING_LEFT: (0) NORMAL: NO PROBLEMS.
MOVEMENTS_INTERFERE_WITH_RATINGS: NO
FREEZING_GAIT: (0) NORMAL: NO FREEZING.
HANDMOVEMENTS_RIGHT: (0) NORMAL: NO PROBLEMS.
PRONATION_SUPINATION_RIGHT: (0) NORMAL: NO PROBLEMS.
LEG_AGILITY_RIGHT: (0) NORMAL: NO PROBLEMS.
HANDMOVEMENTS_LEFT: (1) SLIGHT: ANY OF THE FOLLOWING: A) THE REGULAR RHYTHM IS BROKEN WITH ONE WITH ONE OR TWO INTERRUPTIONS OR HESITATIONS OF THE MOVEMENT  B) SLIGHT SLOWING  C) THE AMPLITUDE DECREMENTS NEAR THE END OF THE 10 MOVEMENTS.
AMPLITUDE_RUE: (0) NORMAL: NO TREMOR.
POSTURE: (1) SLIGHT: NOT QUITE ERECT, BUT COULD BE NORMAL FOR OLDER PERSONS.
RIGIDITY_RUE: (0) NORMAL: NO RIGIDITY.
AMPLITUDE_LIP_JAW: (0) NORMAL: NO TREMOR.
TOTAL_SCORE_LEFT: 2
SPEECH: (0) NORMAL: NO SPEECH PROBLEMS.
RIGIDITY_RLE: (0) NORMAL: NO RIGIDITY.
LEG_AGILITY_LEFT: (0) NORMAL: NO PROBLEMS.
RIGIDITY_LUE: (0) NORMAL: NO RIGIDITY.
AMPLITUDE_LLE: (0) NORMAL: NO TREMOR.
TOETAPPING_RIGHT: (0) NORMAL: NO PROBLEMS.
TOTAL_SCORE: 7
FACIAL_EXPRESSION: (1) SLIGHT: MINIMAL MASKED FACIES MANIFESTED ONLY BY DECREASED FREQUENCY OF BLINKING.
DYSKINESIAS_PRESENT: YES
ARISING_CHAIR: (0) NORMAL: NO PROBLEMS. ABLE TO ARISE QUICKLY WITHOUT HESITATION.
RIGIDITY_NECK: (0) NORMAL: NO RIGIDITY.
SPONTANEITY_OF_MOVEMENT: (1) SLIGHT: SLIGHT GLOBAL SLOWNESS AND POVERTY OF SPONTANEOUS MOVEMENTS.

## 2025-01-27 NOTE — LETTER
2025      Carmen Carvajal  [address] [facility] [address]      Dear Colleague,    Thank you for referring your patient, Carmen Carvajal, to the [facility]OLOG[facility]. Please see a copy of my visit note below.    Department of Neurology  Movement Disorders Division   Return Patient Visit    Patient: Carmen Carvajal   MRN: [id number]   : 1960   Date of Visit: 2025    PCP: Carolyn Duong   Referring provider: No ref. provider found    CC:  Suspected tardive syndrome    Interval history:  Ms. Carvajal is a 65 year old female with history significant for multiple psychiatric problems, with chronic exposure to Abilify who presents to movement disorder clinic for follow-up. Last visit was 24 at which time it was recommended that she stop her Abilify and her benztropine was increased to 1.5 mg BID. Ms. Carvajal presents with her .     She stopped the Abilify in November and she noticed some degree of improvement of her walking but tremor has continued to occur with walking. Benztropine is causing dry mouth and mild nausea. Gets no nausea with noon Sinemet. Doesn't know if the improvements were from the Abilify stopping or the benztropine. Mood has been stable since the last visit. No memory complaints.     She notes shaking of her legs shortly after starting for a minute or so. Doesn't appreciate other abnormal movements explicitly denies orofacial movements. Had a fall where she tripped over a rug and broke her shoulder. Eating and swallowing are going well. Has some kicking of her legs while going to bed but does not move while asleep.       Related Medications AM Noon PM   Carbidopa/Levodopa  mg 1.5 1.5 1.5   Benztropine 1 mg 1.5  1.5     ROS:  All others negative except as listed above. Pertinent movement disorders-specific ROS listed above.    Past Medical History:   Diagnosis Date     Hypertension      Retinal detachment     right eye  retina tear repair         Past Surgical History:   Procedure Laterality Date     RETINAL REATTACHMENT      laser retinopexy june and august 2014 right eye     thyroid  1990's    radiation          Current Outpatient Medications:      AMLODIPINE BESYLATE PO, Take 10 mg by mouth daily, Disp: , Rfl:      benztropine (COGENTIN) 1 MG tablet, Take 1 tablet in the morning, 1.5 tablets at bedtime for two weeks. Then increase to 1.5 tablets twice a day and stay at that dose., Disp: 270 tablet, Rfl: 4     carbidopa-levodopa (SINEMET)  MG tablet, Take 1.5 tablets by mouth 3 times daily, Disp: 405 tablet, Rfl: 3     Cholecalciferol (VITAMIN D3 PO), Take 2,000 Units by mouth daily, Disp: , Rfl:      LEVOTHYROXINE SODIUM PO, Take 125 mcg by mouth daily, Disp: , Rfl:      losartan-hydrochlorothiazide (HYZAAR) 100-12.5 MG per tablet, Take 1 tablet by mouth daily, Disp: , Rfl:      methylcellulose (CITRUCEL) 500 MG TABS, Take 500 mg by mouth daily, Disp: , Rfl:      sertraline (ZOLOFT) 100 MG tablet, Take 100 mg by mouth daily, Disp: , Rfl:      vitamin B complex with vitamin C (STRESS TAB) tablet, Take 1 tablet by mouth daily, Disp: , Rfl:      vitamin C (ASCORBIC ACID) 500 MG tablet, Take 1,000 mg by mouth, Disp: , Rfl:      ARIPiprazole (ABILIFY) 10 MG tablet, Take 2.5 mg by mouth daily (Patient not taking: Reported on 1/27/2025), Disp: , Rfl:      multivitamin, therapeutic (THERA-VIT) TABS, Take 1 tablet by mouth daily (Patient not taking: Reported on 1/27/2025), Disp: , Rfl:      Allergies   Allergen Reactions     Ace Inhibitors Cough     Verified in Meditech: Y, Severity in Meditech: S  HUT Comment: Verified in Meditech: Y, Severity in Meditech: S; HUT Reaction: Cough          Family History   Problem Relation Age of Onset     Retinal detachment Mother         Social History:  She  reports that she has never smoked. She has never used smokeless tobacco. She reports that she does not drink alcohol.     PHYSICAL  EXAM:  /80   Pulse 76   SpO2 96%      NEURO:  Motor: Dyskinetic/akithetic movements in BUE and BLE as well as neck. No significant orofacial movements      9/9/2024    10:00 AM 1/27/2025     8:00 AM   UPDRS Motor Scale   Time: 10:37 08:45   Medication On On   R Brain DBS: None None   L Brain DBS: None None   Dyskinesia (LID) Yes Yes   Did LID interfere No No   Speech 0 0   Facial Expression 1 1   Rigidity Neck 0 0   Rigidity RUE 0 0   Rigidity LUE 0 0   Rigidity RLE 0 0   Rigidity LLE 0 0   Finger Taps R 0 1   Finger Taps L 1 1   Hand Mvt R 0 0   Hand Mvt L 0 1   Pron-/Supinate R 0 0   Pron-/Supinate L 1 0   Toe Tap R 0 0   Toe Tap L 0 0   Leg Agility R 0 0   Leg Agility L 0 0   Arise From Chair 0 0   Gait 1 1   Gait Freezing 0 0   Postural Stability 1 0   Posture 1 1   Global Spont Mvt 1 1   Postural Tremor RUE 1 0   Postural Tremor LUE 0 0   Kinetic Tremor RUE 0 0   Kinetic Tremor LUE 0 0   Rest Tremor RUE 0 0   Rest Tremor LUE 0 0   Rest Tremor RLE 0 0   Rest Tremor LLE 0 0   Rest Tremor Lip/Jaw 0 0   Rest Tremor Constancy 0 0   Total Right 1 1   Total Left 2 2   Axial Total 5 4   Total 8 7   Gait: Reduced arm swing bilaterally with normal stride length    DATA REVIEWED:  Reviewed all the pertinent data available in epic    ASSESSMENT:  65 year old female with chronic exposure to Abilify presenting with a 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 Her leg movements remain most consistent with tardive phenomena from Abilify with some mild drug induced parkinsonism. It can take quite awhile for these symptoms to improve with cessation of the Abilify (12-18 months) so we will continue to monitor and continue meds on the same regimen.    PLAN:  - Continue current dose of benztropine and Sinemet  - Asked her to reach out if things worsen  - RTC in 4-6 months    Patient seen and discussed with Dr. Yumiko Cordero MD  Neuromodulation/Movement Disorders  Fellow            Attestation signed by Andrea Howell MD at 2/3/2025 11:23 AM:  Attestation entered as a separate note.      I, Andrea Mason MD, personally interviewed, examined and evaluated this patient. I personally reviewed the vital signs, medications and pertinent labs/imaging. I discussed the patient with Dr Cordero and agree with the assessment, examination and plan of care documented, with the additions and/or exceptions delineated below:    Overall doing well, still with some residual symptoms, particularly her lower extremities.  She has been off the Abilify now for a couple of months, and she seems a little anxious regarding the fact that her hands improved but her legs are still symptomatic.  The interval examination does show objective signs of improvement which is reassuring.    We talked about the fact that he can take several months up to 1.5 to 2 years sometimes to see a significant improvement in symptoms, and sometimes there are situations in which there are residual symptoms, but she feels that the symptoms that she has at this point they are nondisabling to justify any changes in medications, so I think it is reasonable to continue to wait longitudinally and see if symptoms continue to improve, though always be the option to start any medications for those residual tremors, but at this point no pharmacological intervention is indicated because what she is experiencing is not disabling.    Will plan on seeing her back in about 6 months.  Sooner if needed.  Encouraged to contact us back if any questions or concerns.      The longitudinal plan of care for Carmen was addressed during this visit. Due to the added complexity in care, I will continue to support Carmen Carvajal in the subsequent management of this condition(s) and with the ongoing continuity of care of this condition(s).    Attending attestation: Today a total 30 minutes were spent on this case, in  face-to-face, examining, obtaining history, providing education and coordination of care. Additional time was spent in chart review, ancillary data, and documentation as delineated above.      Again, thank you for allowing me to participate in the care of your patient.        Sincerely,        Andrea Mason MD    Electronically signed

## 2025-01-27 NOTE — NURSING NOTE
"Carmen Carvajal is a 65 year old female who presents for:  Chief Complaint   Patient presents with    Follow Up     Follow Up 3 to 4 month  Movement Disorder         Initial Vitals:  /80   Pulse 76   SpO2 96%  Estimated body mass index is 32.45 kg/m  as calculated from the following:    Height as of 8/23/20: 1.664 m (5' 5.5\").    Weight as of 1/29/24: 89.8 kg (198 lb).. There is no height or weight on file to calculate BSA. BP completed using cuff size: large  Data Unavailable    Nursing Comments:     Era Pedroza MA   "

## 2025-01-27 NOTE — PATIENT INSTRUCTIONS
__ It can take quite awhile for the affects of the Abilify to get the symptoms to improve, often 12-18 months off of the medication.  __ We will continue the current medications for the time being and see how things go  __ Please let us know if things get worse despite being off the medication

## 2025-01-27 NOTE — PROGRESS NOTES
Department of Neurology  Movement Disorders Division   Return Patient Visit    Patient: Carmen Carvajal   MRN: [id number]   : 1960   Date of Visit: 2025    PCP: Carolyn Duong   Referring provider: No ref. provider found    CC:  Suspected tardive syndrome    Interval history:  Ms. Carvajal is a 65 year old female with history significant for multiple psychiatric problems, with chronic exposure to Abilify who presents to movement disorder clinic for follow-up. Last visit was 24 at which time it was recommended that she stop her Abilify and her benztropine was increased to 1.5 mg BID. Ms. Carvajal presents with her .     She stopped the Abilify in November and she noticed some degree of improvement of her walking but tremor has continued to occur with walking. Benztropine is causing dry mouth and mild nausea. Gets no nausea with noon Sinemet. Doesn't know if the improvements were from the Abilify stopping or the benztropine. Mood has been stable since the last visit. No memory complaints.     She notes shaking of her legs shortly after starting for a minute or so. Doesn't appreciate other abnormal movements explicitly denies orofacial movements. Had a fall where she tripped over a rug and broke her shoulder. Eating and swallowing are going well. Has some kicking of her legs while going to bed but does not move while asleep.       Related Medications AM Noon PM   Carbidopa/Levodopa  mg 1.5 1.5 1.5   Benztropine 1 mg 1.5  1.5     ROS:  All others negative except as listed above. Pertinent movement disorders-specific ROS listed above.    Past Medical History:   Diagnosis Date    Hypertension     Retinal detachment     right eye retina tear repair         Past Surgical History:   Procedure Laterality Date    RETINAL REATTACHMENT      laser retinopexy  and 2014 right eye    thyroid      radiation          Current Outpatient Medications:     AMLODIPINE BESYLATE PO,  Take 10 mg by mouth daily, Disp: , Rfl:     benztropine (COGENTIN) 1 MG tablet, Take 1 tablet in the morning, 1.5 tablets at bedtime for two weeks. Then increase to 1.5 tablets twice a day and stay at that dose., Disp: 270 tablet, Rfl: 4    carbidopa-levodopa (SINEMET)  MG tablet, Take 1.5 tablets by mouth 3 times daily, Disp: 405 tablet, Rfl: 3    Cholecalciferol (VITAMIN D3 PO), Take 2,000 Units by mouth daily, Disp: , Rfl:     LEVOTHYROXINE SODIUM PO, Take 125 mcg by mouth daily, Disp: , Rfl:     losartan-hydrochlorothiazide (HYZAAR) 100-12.5 MG per tablet, Take 1 tablet by mouth daily, Disp: , Rfl:     methylcellulose (CITRUCEL) 500 MG TABS, Take 500 mg by mouth daily, Disp: , Rfl:     sertraline (ZOLOFT) 100 MG tablet, Take 100 mg by mouth daily, Disp: , Rfl:     vitamin B complex with vitamin C (STRESS TAB) tablet, Take 1 tablet by mouth daily, Disp: , Rfl:     vitamin C (ASCORBIC ACID) 500 MG tablet, Take 1,000 mg by mouth, Disp: , Rfl:     ARIPiprazole (ABILIFY) 10 MG tablet, Take 2.5 mg by mouth daily (Patient not taking: Reported on 1/27/2025), Disp: , Rfl:     multivitamin, therapeutic (THERA-VIT) TABS, Take 1 tablet by mouth daily (Patient not taking: Reported on 1/27/2025), Disp: , Rfl:      Allergies   Allergen Reactions    Ace Inhibitors Cough     Verified in Meditech: Y, Severity in Meditech: S  HUT Comment: Verified in Meditech: Y, Severity in Meditech: S; HUT Reaction: Cough          Family History   Problem Relation Age of Onset    Retinal detachment Mother         Social History:  She  reports that she has never smoked. She has never used smokeless tobacco. She reports that she does not drink alcohol.     PHYSICAL EXAM:  /80   Pulse 76   SpO2 96%      NEURO:  Motor: Dyskinetic/akithetic movements in BUE and BLE as well as neck. No significant orofacial movements      9/9/2024    10:00 AM 1/27/2025     8:00 AM   UPDRS Motor Scale   Time: 10:37 08:45   Medication On On   R Brain DBS:  None None   L Brain DBS: None None   Dyskinesia (LID) Yes Yes   Did LID interfere No No   Speech 0 0   Facial Expression 1 1   Rigidity Neck 0 0   Rigidity RUE 0 0   Rigidity LUE 0 0   Rigidity RLE 0 0   Rigidity LLE 0 0   Finger Taps R 0 1   Finger Taps L 1 1   Hand Mvt R 0 0   Hand Mvt L 0 1   Pron-/Supinate R 0 0   Pron-/Supinate L 1 0   Toe Tap R 0 0   Toe Tap L 0 0   Leg Agility R 0 0   Leg Agility L 0 0   Arise From Chair 0 0   Gait 1 1   Gait Freezing 0 0   Postural Stability 1 0   Posture 1 1   Global Spont Mvt 1 1   Postural Tremor RUE 1 0   Postural Tremor LUE 0 0   Kinetic Tremor RUE 0 0   Kinetic Tremor LUE 0 0   Rest Tremor RUE 0 0   Rest Tremor LUE 0 0   Rest Tremor RLE 0 0   Rest Tremor LLE 0 0   Rest Tremor Lip/Jaw 0 0   Rest Tremor Constancy 0 0   Total Right 1 1   Total Left 2 2   Axial Total 5 4   Total 8 7   Gait: Reduced arm swing bilaterally with normal stride length    DATA REVIEWED:  Reviewed all the pertinent data available in epic    ASSESSMENT:  65 year old female with chronic exposure to Abilify presenting with a 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 Her leg movements remain most consistent with tardive phenomena from Abilify with some mild drug induced parkinsonism. It can take quite awhile for these symptoms to improve with cessation of the Abilify (12-18 months) so we will continue to monitor and continue meds on the same regimen.    PLAN:  - Continue current dose of benztropine and Sinemet  - Asked her to reach out if things worsen  - RTC in 4-6 months    Patient seen and discussed with Dr. Yumiko Cordero MD  Neuromodulation/Movement Disorders Fellow

## 2025-02-03 NOTE — PROGRESS NOTES
I, Andrea Mason MD, personally interviewed, examined and evaluated this patient. I personally reviewed the vital signs, medications and pertinent labs/imaging. I discussed the patient with Dr Cordero and agree with the assessment, examination and plan of care documented, with the additions and/or exceptions delineated below:    Overall doing well, still with some residual symptoms, particularly her lower extremities.  She has been off the Abilify now for a couple of months, and she seems a little anxious regarding the fact that her hands improved but her legs are still symptomatic.  The interval examination does show objective signs of improvement which is reassuring.    We talked about the fact that he can take several months up to 1.5 to 2 years sometimes to see a significant improvement in symptoms, and sometimes there are situations in which there are residual symptoms, but she feels that the symptoms that she has at this point they are nondisabling to justify any changes in medications, so I think it is reasonable to continue to wait longitudinally and see if symptoms continue to improve, though always be the option to start any medications for those residual tremors, but at this point no pharmacological intervention is indicated because what she is experiencing is not disabling.    Will plan on seeing her back in about 6 months.  Sooner if needed.  Encouraged to contact us back if any questions or concerns.      The longitudinal plan of care for Carmen was addressed during this visit. Due to the added complexity in care, I will continue to support Carmen Carvajal in the subsequent management of this condition(s) and with the ongoing continuity of care of this condition(s).    Attending attestation: Today a total 30 minutes were spent on this case, in face-to-face, examining, obtaining history, providing education and coordination of care. Additional time was spent in chart review, ancillary data,  and documentation as delineated above.

## 2025-02-22 NOTE — TELEPHONE ENCOUNTER
Patient confirmed new date/time for follow up visit with Dr. Calderon. Patient is rescheduled for June 11 at 9:30 a.   
Never smoker

## 2025-03-10 ENCOUNTER — TELEPHONE (OUTPATIENT)
Dept: NEUROLOGY | Facility: CLINIC | Age: 65
End: 2025-03-10
Payer: COMMERCIAL

## 2025-03-10 NOTE — TELEPHONE ENCOUNTER
Per chart review, benztropine was started 6/11/24.    LOV 1/27/25 pt saw Dr. Cordero and Dr. Calderon.     Prescribed benztropine 1.5 mg BID.    Pt now reporting nausea,  decreased taste and dry eyes.      Pt asking if she could stop benztropine, and discuss other options.     Routing to provider.     Marie RN, BSN  [facility] Neurology

## 2025-03-10 NOTE — TELEPHONE ENCOUNTER
[facility] Call Center    Phone Message    May a detailed message be left on voicemail: yes     Reason for Call: Medication Question or concern regarding medication   Prescription Clarification  Name of Medication: benztropine (COGENTIN) 1 MG tablet   Prescribing Provider: Dr. Calderon    Pharmacy: N/A   What on the order needs clarification? Pt is requesting a call back from care team member to discuss medication listed above. Pt states she would like to discuss stopping this medication, and discuss other options for medication as she is experiencing some side effects she states. Pt states she has no sense of taste, dry eyes, is nauseous.    Please contact Pt to discuss and advise at [phone]    Action Taken: Message routed to:  Other: [facility]     Travel Screening: Not Applicable

## 2025-03-11 ENCOUNTER — TELEPHONE (OUTPATIENT)
Dept: NEUROLOGY | Facility: CLINIC | Age: 65
End: 2025-03-11
Payer: COMMERCIAL

## 2025-03-11 NOTE — TELEPHONE ENCOUNTER
Called and discussed the patient's Cogentin and the side effects of nausea, dry mouth, dry eyes, and fatigue that are becoming increasingly bothersome. The medication has helped with her dyskinetic movements but these side effects have become intolerable. It is probable that these side effects are at least in part due to the medication and therefore will slowly downtitrate the medication to see if we can maintain efficacy while limiting side effects. Asked her to decrease AM dose to 1 tab while keeping QHS dose at 1.5 tabs for 2 weeks and then drop to 1 tab BID and to let us know how this goes. Her Sinemet appears to be working well for parkinsonism and the side effects are less likely due to this so no changes were made in this medication.    Robert Cordero MD  Neuromodulation/Movement Disorders Fellow     Resident

## 2025-03-11 NOTE — TELEPHONE ENCOUNTER
Dr. Cordero called pt.  See 3/11/25 OV note.     Marie, RN, BSN  [facility]rology

## 2025-03-28 ENCOUNTER — TELEPHONE (OUTPATIENT)
Dept: NEUROLOGY | Facility: CLINIC | Age: 65
End: 2025-03-28
Payer: COMMERCIAL

## 2025-03-28 NOTE — TELEPHONE ENCOUNTER
[facility] Call Center    Phone Message    May a detailed message be left on voicemail: yes     Reason for Call:  patient called to speak to care team regarding benztropine (COGENTIN) 1 MG tablet , was informed to return call in 2 weeks after changing to lower dose.     Action Taken: Other: [facility] neurolog[facility]    Travel Screening: Not Applicable     Date of Service:                                                                  .

## 2025-03-31 NOTE — TELEPHONE ENCOUNTER
Reviewed Dr. Cordero's TE from 3/11/25.    Spoke with pt.     She is still on Cogentin 1 tab in morning and 1 tab at night.  She is still having the same side effects as before, but they are more mild.     She has not noticed any change to her tremors, and doesn't feel like the medication is helpful.     Please advise if recommended to discontinue cogentin, and if she needs to wean down.     Marie RN, BSN  [facility] Neurology

## 2025-03-31 NOTE — TELEPHONE ENCOUNTER
Per Dr. Cordero:  She can continue to wean it down if she doesn't feel that it is helping at all. I would do 0.5 tablets in the morning and 1 tab in the evening for 2 weeks and then 0.5 tablets BID for 2 weeks. At that point she can stop the morning dose for 2 weeks and then stop the medication entirely.     Called pt and relayed provider message.      Pt verbalized understanding and had no further questions at this time.  Advised can call back to clinic at any time with questions or concerns or if symptoms worsen.     Marie RN, BSN  [facility]

## 2025-04-18 DIAGNOSIS — G21.11 NEUROLEPTIC-INDUCED PARKINSONISM: ICD-10-CM

## 2025-04-18 DIAGNOSIS — R26.9 GAIT DISORDER: ICD-10-CM

## 2025-04-18 DIAGNOSIS — T43.505A NEUROLEPTIC-INDUCED PARKINSONISM: ICD-10-CM

## 2025-04-18 RX ORDER — CARBIDOPA AND LEVODOPA 25; 100 MG/1; MG/1
1.5 TABLET ORAL 3 TIMES DAILY
Qty: 405 TABLET | Refills: 3 | Status: SHIPPED | OUTPATIENT
Start: 2025-04-18

## 2025-04-18 NOTE — TELEPHONE ENCOUNTER
Received fax from pharmacy requesting refill(s) for     carbidopa-levodopa (SINEMET)  MG tablet     Last refilled on 1/11/25    Pt last seen on 1/27/25  Next appt scheduled for 7/14/25    E-prescribe to:    [id number] - [place], [place] - [address] AT [facility] [address]     Will route to nursing pool for review

## 2025-04-18 NOTE — TELEPHONE ENCOUNTER
Please see note below for more details.     Last Written Prescription Date:  2/12/2024  Last Fill Quantity: 405,  # refills: 3    Prescription approved per [facility] Refill Protocol.    Danette HURST RN, BSN  [facility]

## 2025-07-14 ENCOUNTER — OFFICE VISIT (OUTPATIENT)
Dept: NEUROLOGY | Facility: CLINIC | Age: 65
End: 2025-07-14
Payer: COMMERCIAL

## 2025-07-14 VITALS
HEART RATE: 71 BPM | BODY MASS INDEX: 33.63 KG/M2 | SYSTOLIC BLOOD PRESSURE: 117 MMHG | OXYGEN SATURATION: 95 % | DIASTOLIC BLOOD PRESSURE: 85 MMHG | WEIGHT: 205.2 LBS

## 2025-07-14 DIAGNOSIS — G21.11 NEUROLEPTIC-INDUCED PARKINSONISM: Primary | ICD-10-CM

## 2025-07-14 DIAGNOSIS — T43.505A NEUROLEPTIC-INDUCED PARKINSONISM: Primary | ICD-10-CM

## 2025-07-14 PROCEDURE — 3074F SYST BP LT 130 MM HG: CPT | Performed by: PSYCHIATRY & NEUROLOGY

## 2025-07-14 PROCEDURE — 3079F DIAST BP 80-89 MM HG: CPT | Performed by: PSYCHIATRY & NEUROLOGY

## 2025-07-14 PROCEDURE — G2211 COMPLEX E/M VISIT ADD ON: HCPCS | Performed by: PSYCHIATRY & NEUROLOGY

## 2025-07-14 PROCEDURE — 99214 OFFICE O/P EST MOD 30 MIN: CPT | Performed by: PSYCHIATRY & NEUROLOGY

## 2025-07-14 ASSESSMENT — UNIFIED PARKINSONS DISEASE RATING SCALE (UPDRS)
MOVEMENTS_INTERFERE_WITH_RATINGS: NO
TOETAPPING_RIGHT: (0) NORMAL: NO PROBLEMS.
LEG_AGILITY_LEFT: (0) NORMAL: NO PROBLEMS.
ARISING_CHAIR: (0) NORMAL: NO PROBLEMS. ABLE TO ARISE QUICKLY WITHOUT HESITATION.
POSTURE: (1) SLIGHT: NOT QUITE ERECT, BUT COULD BE NORMAL FOR OLDER PERSONS.
LEG_AGILITY_RIGHT: (0) NORMAL: NO PROBLEMS.
FACIAL_EXPRESSION: (1) SLIGHT: MINIMAL MASKED FACIES MANIFESTED ONLY BY DECREASED FREQUENCY OF BLINKING.
GAIT: (1) SLIGHT: INDEPENDENT WALKING WITH MINOR GAIT IMPAIRMENT.
AXIAL_SCORE: 4
RIGIDITY_NECK: (0) NORMAL: NO RIGIDITY.
PRONATION_SUPINATION_LEFT: (0) NORMAL: NO PROBLEMS.
RIGIDITY_LLE: (0) NORMAL: NO RIGIDITY.
AMPLITUDE_LIP_JAW: (0) NORMAL: NO TREMOR.
HANDMOVEMENTS_LEFT: (1) SLIGHT: ANY OF THE FOLLOWING: A) THE REGULAR RHYTHM IS BROKEN WITH ONE WITH ONE OR TWO INTERRUPTIONS OR HESITATIONS OF THE MOVEMENT  B) SLIGHT SLOWING  C) THE AMPLITUDE DECREMENTS NEAR THE END OF THE 10 MOVEMENTS.
AMPLITUDE_RUE: (0) NORMAL: NO TREMOR.
SPONTANEITY_OF_MOVEMENT: (1) SLIGHT: SLIGHT GLOBAL SLOWNESS AND POVERTY OF SPONTANEOUS MOVEMENTS.
PARKINSONS_MEDS: ON
RIGIDITY_RUE: (0) NORMAL: NO RIGIDITY.
HANDMOVEMENTS_RIGHT: (0) NORMAL: NO PROBLEMS.
RIGIDITY_LUE: (0) NORMAL: NO RIGIDITY.
FINGER_TAPPING_RIGHT: (1) SLIGHT: ANY OF THE FOLLOWING: A) THE REGULAR RHYTHM IS BROKEN WITH ONE WITH ONE OR TWO INTERRUPTIONS OR HESITATIONS OF THE MOVEMENT  B) SLIGHT SLOWING  C) THE AMPLITUDE DECREMENTS NEAR THE END OF THE 10 MOVEMENTS.
POSTURAL_STABILITY: (0) NORMAL: NO PROBLEMS. RECOVERS WITH ONE OR TWO STEPS.
TOTAL_SCORE: 7
FREEZING_GAIT: (0) NORMAL: NO FREEZING.
AMPLITUDE_RLE: (0) NORMAL: NO TREMOR.
TOTAL_SCORE_LEFT: 2
RIGIDITY_RLE: (0) NORMAL: NO RIGIDITY.
DYSKINESIAS_PRESENT: YES
AMPLITUDE_LLE: (0) NORMAL: NO TREMOR.
TOTAL_SCORE: 1
FINGER_TAPPING_LEFT: (1) SLIGHT: ANY OF THE FOLLOWING: A) THE REGULAR RHYTHM IS BROKEN WITH ONE WITH ONE OR TWO INTERRUPTIONS OR HESITATIONS OF THE MOVEMENT  B) SLIGHT SLOWING  C) THE AMPLITUDE DECREMENTS NEAR THE END OF THE 10 MOVEMENTS.
TOETAPPING_LEFT: (0) NORMAL: NO PROBLEMS.
CONSTANCY_TREMOR_ATREST: (0) NORMAL: NO TREMOR.
SPEECH: (0) NORMAL: NO SPEECH PROBLEMS.
AMPLITUDE_LUE: (0) NORMAL: NO TREMOR.
PRONATION_SUPINATION_RIGHT: (0) NORMAL: NO PROBLEMS.

## 2025-07-14 NOTE — NURSING NOTE
"Carmen Carvajal is a 65 year old female who presents for:  Chief Complaint   Patient presents with    Parkinson     Follow Up 3 to 4 month        Initial Vitals:  /85   Pulse 71   SpO2 95%  Estimated body mass index is 32.45 kg/m  as calculated from the following:    Height as of 8/23/20: 1.664 m (5' 5.5\").    Weight as of 1/29/24: 89.8 kg (198 lb).. There is no height or weight on file to calculate BSA. BP completed using cuff size: regular  Data Unavailable    Nursing Comments:     Era Pedroza MA   "

## 2025-07-14 NOTE — PROGRESS NOTES
Department of Neurology  Movement Disorders Division   Return Patient Visit    Patient: Carmen Carvajal   MRN: [id number]   : 1960   Date of Visit: 2025  PCP: Carolyn Duong MD   Referring provider: Jose Mason    CC:  Parkinsonism, suspected to be due to chronic use of Abilify, follow-up    Interval history:  Ms. 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 Last visit was January, with a plan to continue Sinemet and supportive care.    Continues to do well, no interval concerns.  She does complaint of some dry eyes but it is unclear to whether that is related to any of the medications were prescribed or not.    No falls, no speech or swallowing concerns.  Doing well psychiatrically no new medications have been placed on board.    She is try to stay active the best she can.    ROS:  All others negative except as listed above. Pertinent movement disorders-specific ROS listed above.    Past Medical History:   Diagnosis Date    Hypertension     Retinal detachment     right eye retina tear repair         Past Surgical History:   Procedure Laterality Date    RETINAL REATTACHMENT      laser retinopexy  and 2014 right eye    thyroid      radiation          Current Outpatient Medications:     AMLODIPINE BESYLATE PO, Take 10 mg by mouth daily, Disp: , Rfl:     carbidopa-levodopa (SINEMET)  MG tablet, Take 1.5 tablets by mouth 3 times daily., Disp: 405 tablet, Rfl: 3    Cholecalciferol (VITAMIN D3 PO), Take 2,000 Units by mouth daily, Disp: , Rfl:     LEVOTHYROXINE SODIUM PO, Take 125 mcg by mouth daily, Disp: , Rfl:     losartan-hydrochlorothiazide (HYZAAR) 100-12.5 MG per tablet, Take 1 tablet by mouth daily, Disp: , Rfl:     methylcellulose (CITRUCEL) 500 MG TABS, Take 500 mg by mouth daily, Disp: , Rfl:     sertraline  (ZOLOFT) 100 MG tablet, Take 100 mg by mouth daily, Disp: , Rfl:     vitamin B complex with vitamin C (STRESS TAB) tablet, Take 1 tablet by mouth daily, Disp: , Rfl:     vitamin C (ASCORBIC ACID) 500 MG tablet, Take 1,000 mg by mouth, Disp: , Rfl:     ARIPiprazole (ABILIFY) 10 MG tablet, Take 2.5 mg by mouth daily (Patient not taking: Reported on 7/14/2025), Disp: , Rfl:     benztropine (COGENTIN) 1 MG tablet, Take 1 tablet in the morning, 1.5 tablets at bedtime for two weeks. Then increase to 1.5 tablets twice a day and stay at that dose. (Patient not taking: Reported on 7/14/2025), Disp: 270 tablet, Rfl: 4    multivitamin, therapeutic (THERA-VIT) TABS, Take 1 tablet by mouth daily (Patient not taking: Reported on 7/14/2025), Disp: , Rfl:      Allergies   Allergen Reactions    Ace Inhibitors Cough     Verified in Meditech: Y, Severity in Meditech: S  HUT Comment: Verified in Meditech: Y, Severity in Meditech: S; HUT Reaction: Cough          Family History   Problem Relation Age of Onset    Retinal detachment Mother         Social History:  She  reports that she has never smoked. She has never used smokeless tobacco. She reports that she does not drink alcohol.     PHYSICAL EXAM:  /85   Pulse 71   Wt 93.1 kg (205 lb 3.2 oz)   SpO2 95%   BMI 33.63 kg/m       NEURO:  UPDRS  Time:: 0900  Medication: On  R Brain DBS:: None  L Brain DBS:: None  Dyskinesia (LID): Yes  Did LID interfere: No  Speech: (0) Normal: No speech problems.  Facial Expression: (1) Slight: Minimal masked facies manifested only by decreased frequency of blinking.  Rigidity Neck: (0) Normal: No rigidity.  Rigidity RUE: (0) Normal: No rigidity.  Rigidity LUE: (0) Normal: No rigidity.  Rigidity RLE: (0) Normal: No rigidity.  Rigidity LLE: (0) Normal: No rigidity.  Finger Taps R: (1) Slight: Any of the following: a) the regular rhythm is broken with one with one or two interruptions or hesitations of the movement  b) slight slowing  c) the  amplitude decrements near the end of the 10 movements.  Finger Taps L: (1) Slight: Any of the following: a) the regular rhythm is broken with one with one or two interruptions or hesitations of the movement  b) slight slowing  c) the amplitude decrements near the end of the 10 movements.  Hand Mvt R: (0) Normal: No problems.  Hand Mvt L: (1) Slight: Any of the following: a) the regular rhythm is broken with one with one or two interruptions or hesitations of the movement  b) slight slowing  c) the amplitude decrements near the end of the 10 movements.  Pron-/Supinate R: (0) Normal: No problems.  Pron-/Supinate L: (0) Normal: No problems.  Toe Tap R: (0) Normal: No problems.  Toe Tap L: (0) Normal: No problems.  Leg Agility R: (0) Normal: No problems.  Leg Agility L: (0) Normal: No problems.  Arise From Chair: (0) Normal: No problems. Able to arise quickly without hesitation.  Gait: (1) Slight: Independent walking with minor gait impairment.  Gait Freezing: (0) Normal: No freezing.  Postural Stability: (0) Normal: No problems. Recovers with one or two steps.  Posture: (1) Slight: Not quite erect, but could be normal for older persons.  Global Spont Mvt: (1) Slight: Slight global slowness and poverty of spontaneous movements.  Postural Tremor RUE: (0) Normal: No tremor.  Postural Tremor LUE: (0) Normal: No tremor.  Kinetic Tremor RUE: (0) Normal: No tremor.  Kinetic Tremor LUE: (0) Normal: No tremor.  Rest Tremor RUE: (0) Normal: No tremor.  Rest Tremor LUE: (0) Normal: No tremor.  Rest Tremor RLE: (0) Normal: No tremor.  Rest Tremor LLE: (0) Normal: No tremor.  Rest Tremor Lip/Jaw: (0) Normal: No tremor.  Rest Tremor Constancy: (0) Normal: No tremor.  Total Right: 1  Total Left: 2  Axial Total: 4  Total: 7    DATA REVIEWED:  Reviewed pertinent data from epic.    ASSESSMENT:  65-year-old female with suspected drug-induced parkinsonism, here for follow-up.  Continues to do well.  No interval concerns for any changes in  symptoms.    PLAN:  Reviewed impression and plan with patient    - Continue meds the same.  We did speak about the fact that, if he is concerned about Sinemet being the cause for dry eyes, she could pursue a trial of lowering from 1.5 to 1 tablet per dose for a period of a week and continue at the same time intervals and see whether her dry eyes will get any better.  If he does without compromising symptomatic control, she can stay at a lower dose.  However if there is no difference in her dry eyes and she rather experiences a worsening of parkinsonian symptoms, she can just safely go back up to the previous dose.    - On left continue supportive care.  Continue exercise and maintaining healthy life habits.    - Next visit reschedule next 4 to 6 months.  Sooner if needed.  Encouraged to contact back in the meantime if any questions or concerns.    There are no Patient Instructions on file for this visit.      Andrea Calderon MD (Leo) (he/him/his)   of Neurology  [facility]  Department of Neurology      Thank you for referring Carmen Carvajal to our [facility] Movement Disorders Program, we appreciate the opportunity of being your partner in healthcare. Please feel free to reach out to us at any point if any questions or concerns about this visit.    Attending attestation: Today I spent a total 30 minutes on this case, in face-to-face, examining, obtaining history, providing education and coordination of care. Additional time was spent in chart review, ancillary data, and documentation as delineated above.    The longitudinal plan of care for Carmen was addressed during this visit. Due to the added complexity in care, I will continue to support Carmen Carvajal in the subsequent management of this condition(s) and with the ongoing continuity of care of this condition(s).

## 2025-07-14 NOTE — LETTER
2025      Carmen Carvajal  [address] [facility] [id number]      Dear Colleague,    Thank you for referring your patient, Carmen Carvajal, to the [facility]UROLOGY [facility]ICS [facility]. Please see a copy of my visit note below.    Department of Neurology  Movement Disorders Division   Return Patient Visit    Patient: Carmen Carvajal   MRN: [id number]   : 1960   Date of Visit: 2025  PCP: Carolyn Duong MD   Referring provider: Jose Mason    CC:  Parkinsonism, suspected to be due to chronic use of Abilify, follow-up    Interval history:  Ms. 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 Last visit was January, with a plan to continue Sinemet and supportive care.    Continues to do well, no interval concerns.  She does complaint of some dry eyes but it is unclear to whether that is related to any of the medications were prescribed or not.    No falls, no speech or swallowing concerns.  Doing well psychiatrically no new medications have been placed on board.    She is try to stay active the best she can.    ROS:  All others negative except as listed above. Pertinent movement disorders-specific ROS listed above.    Past Medical History:   Diagnosis Date     Hypertension      Retinal detachment     right eye retina tear repair         Past Surgical History:   Procedure Laterality Date     RETINAL REATTACHMENT      laser retinopexy  and 2014 right eye     thyroid      radiation          Current Outpatient Medications:      AMLODIPINE BESYLATE PO, Take 10 mg by mouth daily, Disp: , Rfl:      carbidopa-levodopa (SINEMET)  MG tablet, Take 1.5 tablets by mouth 3 times daily., Disp: 405 tablet, Rfl: 3     Cholecalciferol (VITAMIN D3 PO), Take 2,000 Units by mouth daily, Disp: , Rfl:      LEVOTHYROXINE  SODIUM PO, Take 125 mcg by mouth daily, Disp: , Rfl:      losartan-hydrochlorothiazide (HYZAAR) 100-12.5 MG per tablet, Take 1 tablet by mouth daily, Disp: , Rfl:      methylcellulose (CITRUCEL) 500 MG TABS, Take 500 mg by mouth daily, Disp: , Rfl:      sertraline (ZOLOFT) 100 MG tablet, Take 100 mg by mouth daily, Disp: , Rfl:      vitamin B complex with vitamin C (STRESS TAB) tablet, Take 1 tablet by mouth daily, Disp: , Rfl:      vitamin C (ASCORBIC ACID) 500 MG tablet, Take 1,000 mg by mouth, Disp: , Rfl:      ARIPiprazole (ABILIFY) 10 MG tablet, Take 2.5 mg by mouth daily (Patient not taking: Reported on 7/14/2025), Disp: , Rfl:      benztropine (COGENTIN) 1 MG tablet, Take 1 tablet in the morning, 1.5 tablets at bedtime for two weeks. Then increase to 1.5 tablets twice a day and stay at that dose. (Patient not taking: Reported on 7/14/2025), Disp: 270 tablet, Rfl: 4     multivitamin, therapeutic (THERA-VIT) TABS, Take 1 tablet by mouth daily (Patient not taking: Reported on 7/14/2025), Disp: , Rfl:      Allergies   Allergen Reactions     Ace Inhibitors Cough     Verified in Meditech: Y, Severity in Meditech: S  HUT Comment: Verified in Meditech: Y, Severity in Meditech: S; HUT Reaction: Cough          Family History   Problem Relation Age of Onset     Retinal detachment Mother         Social History:  She  reports that she has never smoked. She has never used smokeless tobacco. She reports that she does not drink alcohol.     PHYSICAL EXAM:  /85   Pulse 71   Wt 93.1 kg (205 lb 3.2 oz)   SpO2 95%   BMI 33.63 kg/m       NEURO:  UPDRS  Time:: 0900  Medication: On  R Brain DBS:: None  L Brain DBS:: None  Dyskinesia (LID): Yes  Did LID interfere: No  Speech: (0) Normal: No speech problems.  Facial Expression: (1) Slight: Minimal masked facies manifested only by decreased frequency of blinking.  Rigidity Neck: (0) Normal: No rigidity.  Rigidity RUE: (0) Normal: No rigidity.  Rigidity LUE: (0) Normal: No  rigidity.  Rigidity RLE: (0) Normal: No rigidity.  Rigidity LLE: (0) Normal: No rigidity.  Finger Taps R: (1) Slight: Any of the following: a) the regular rhythm is broken with one with one or two interruptions or hesitations of the movement  b) slight slowing  c) the amplitude decrements near the end of the 10 movements.  Finger Taps L: (1) Slight: Any of the following: a) the regular rhythm is broken with one with one or two interruptions or hesitations of the movement  b) slight slowing  c) the amplitude decrements near the end of the 10 movements.  Hand Mvt R: (0) Normal: No problems.  Hand Mvt L: (1) Slight: Any of the following: a) the regular rhythm is broken with one with one or two interruptions or hesitations of the movement  b) slight slowing  c) the amplitude decrements near the end of the 10 movements.  Pron-/Supinate R: (0) Normal: No problems.  Pron-/Supinate L: (0) Normal: No problems.  Toe Tap R: (0) Normal: No problems.  Toe Tap L: (0) Normal: No problems.  Leg Agility R: (0) Normal: No problems.  Leg Agility L: (0) Normal: No problems.  Arise From Chair: (0) Normal: No problems. Able to arise quickly without hesitation.  Gait: (1) Slight: Independent walking with minor gait impairment.  Gait Freezing: (0) Normal: No freezing.  Postural Stability: (0) Normal: No problems. Recovers with one or two steps.  Posture: (1) Slight: Not quite erect, but could be normal for older persons.  Global Spont Mvt: (1) Slight: Slight global slowness and poverty of spontaneous movements.  Postural Tremor RUE: (0) Normal: No tremor.  Postural Tremor LUE: (0) Normal: No tremor.  Kinetic Tremor RUE: (0) Normal: No tremor.  Kinetic Tremor LUE: (0) Normal: No tremor.  Rest Tremor RUE: (0) Normal: No tremor.  Rest Tremor LUE: (0) Normal: No tremor.  Rest Tremor RLE: (0) Normal: No tremor.  Rest Tremor LLE: (0) Normal: No tremor.  Rest Tremor Lip/Jaw: (0) Normal: No tremor.  Rest Tremor Constancy: (0) Normal: No  tremor.  Total Right: 1  Total Left: 2  Axial Total: 4  Total: 7    DATA REVIEWED:  Reviewed pertinent data from epic.    ASSESSMENT:  65-year-old female with suspected drug-induced parkinsonism, here for follow-up.  Continues to do well.  No interval concerns for any changes in symptoms.    PLAN:  Reviewed impression and plan with patient    - Continue meds the same.  We did speak about the fact that, if he is concerned about Sinemet being the cause for dry eyes, she could pursue a trial of lowering from 1.5 to 1 tablet per dose for a period of a week and continue at the same time intervals and see whether her dry eyes will get any better.  If he does without compromising symptomatic control, she can stay at a lower dose.  However if there is no difference in her dry eyes and she rather experiences a worsening of parkinsonian symptoms, she can just safely go back up to the previous dose.    - On left continue supportive care.  Continue exercise and maintaining healthy life habits.    - Next visit reschedule next 4 to 6 months.  Sooner if needed.  Encouraged to contact back in the meantime if any questions or concerns.    There are no Patient Instructions on file for this visit.      Andrea Calderon MD (Leo) (he/him/his)   of Neurology  [facility]  Department of Neurology      Thank you for referring Carmen Carvajal to our [facility] Movement Disorders Program, we appreciate the opportunity of being your partner in healthcare. Please feel free to reach out to us at any point if any questions or concerns about this visit.    Attending attestation: Today I spent a total 30 minutes on this case, in face-to-face, examining, obtaining history, providing education and coordination of care. Additional time was spent in chart review, ancillary data, and documentation as delineated above.    The longitudinal plan of care for Carmen was addressed during this visit. Due to the added complexity in  care, I will continue to support Carmen Mary Carvajal in the subsequent management of this condition(s) and with the ongoing continuity of care of this condition(s).    Again, thank you for allowing me to participate in the care of your patient.        Sincerely,        Andrea Mason MD    Electronically signed